# Patient Record
Sex: MALE | Race: WHITE | Employment: UNEMPLOYED | URBAN - METROPOLITAN AREA
[De-identification: names, ages, dates, MRNs, and addresses within clinical notes are randomized per-mention and may not be internally consistent; named-entity substitution may affect disease eponyms.]

---

## 2020-01-15 ENCOUNTER — HOSPITAL ENCOUNTER (EMERGENCY)
Age: 80
Discharge: HOME | End: 2020-01-16
Attending: EMERGENCY MEDICINE
Payer: MEDICARE

## 2020-01-15 PROCEDURE — 99284 EMERGENCY DEPT VISIT MOD MDM: CPT

## 2020-01-16 VITALS
HEIGHT: 72 IN | RESPIRATION RATE: 16 BRPM | WEIGHT: 235 LBS | HEART RATE: 68 BPM | TEMPERATURE: 97 F | SYSTOLIC BLOOD PRESSURE: 114 MMHG | DIASTOLIC BLOOD PRESSURE: 56 MMHG | OXYGEN SATURATION: 95 % | BODY MASS INDEX: 31.83 KG/M2

## 2020-01-16 LAB
ALBUMIN SERPL-MCNC: 3.7 G/DL (ref 3.5–4.6)
ALP BLD-CCNC: 107 U/L (ref 35–104)
ALT SERPL-CCNC: 7 U/L (ref 0–41)
ANION GAP SERPL CALCULATED.3IONS-SCNC: 13 MEQ/L (ref 9–15)
AST SERPL-CCNC: 15 U/L (ref 0–40)
BACTERIA: ABNORMAL /HPF
BASOPHILS ABSOLUTE: 0 K/UL (ref 0–0.2)
BASOPHILS RELATIVE PERCENT: 0.3 %
BILIRUB SERPL-MCNC: 0.6 MG/DL (ref 0.2–0.7)
BILIRUBIN URINE: ABNORMAL
BLOOD, URINE: ABNORMAL
BUN BLDV-MCNC: 17 MG/DL (ref 8–23)
CALCIUM SERPL-MCNC: 9.1 MG/DL (ref 8.5–9.9)
CHLORIDE BLD-SCNC: 93 MEQ/L (ref 95–107)
CLARITY: ABNORMAL
CO2: 22 MEQ/L (ref 20–31)
COLOR: ABNORMAL
CREAT SERPL-MCNC: 0.81 MG/DL (ref 0.7–1.2)
EOSINOPHILS ABSOLUTE: 0.1 K/UL (ref 0–0.7)
EOSINOPHILS RELATIVE PERCENT: 1.6 %
EPITHELIAL CELLS, UA: ABNORMAL /HPF
GFR AFRICAN AMERICAN: >60
GFR NON-AFRICAN AMERICAN: >60
GLOBULIN: 2.7 G/DL (ref 2.3–3.5)
GLUCOSE BLD-MCNC: 117 MG/DL (ref 70–99)
GLUCOSE URINE: NEGATIVE MG/DL
HCT VFR BLD CALC: 39.4 % (ref 42–52)
HEMOGLOBIN: 13.2 G/DL (ref 14–18)
INR BLD: 1.9
KETONES, URINE: NEGATIVE MG/DL
LEUKOCYTE ESTERASE, URINE: NEGATIVE
LYMPHOCYTES ABSOLUTE: 0.7 K/UL (ref 1–4.8)
LYMPHOCYTES RELATIVE PERCENT: 14.1 %
MCH RBC QN AUTO: 29.6 PG (ref 27–31.3)
MCHC RBC AUTO-ENTMCNC: 33.4 % (ref 33–37)
MCV RBC AUTO: 88.5 FL (ref 80–100)
MONOCYTES ABSOLUTE: 0.3 K/UL (ref 0.2–0.8)
MONOCYTES RELATIVE PERCENT: 5.2 %
NEUTROPHILS ABSOLUTE: 3.9 K/UL (ref 1.4–6.5)
NEUTROPHILS RELATIVE PERCENT: 78.8 %
NITRITE, URINE: NEGATIVE
PDW BLD-RTO: 13.7 % (ref 11.5–14.5)
PH UA: 6.5 (ref 5–9)
PLATELET # BLD: 227 K/UL (ref 130–400)
POTASSIUM SERPL-SCNC: 3.5 MEQ/L (ref 3.4–4.9)
PROSTATE SPECIFIC ANTIGEN: 0.8 NG/ML (ref 0–6.22)
PROTEIN UA: >=300 MG/DL
PROTHROMBIN TIME: 22.1 SEC (ref 12.3–14.9)
RBC # BLD: 4.46 M/UL (ref 4.7–6.1)
RBC UA: >100 /HPF (ref 0–2)
SODIUM BLD-SCNC: 128 MEQ/L (ref 135–144)
SPECIFIC GRAVITY UA: 1.01 (ref 1–1.03)
TOTAL PROTEIN: 6.4 G/DL (ref 6.3–8)
URINE REFLEX TO CULTURE: YES
UROBILINOGEN, URINE: 1 E.U./DL
WBC # BLD: 5 K/UL (ref 4.8–10.8)
WBC UA: ABNORMAL /HPF (ref 0–5)

## 2020-01-16 PROCEDURE — 80053 COMPREHEN METABOLIC PANEL: CPT

## 2020-01-16 PROCEDURE — 87086 URINE CULTURE/COLONY COUNT: CPT

## 2020-01-16 PROCEDURE — 6370000000 HC RX 637 (ALT 250 FOR IP): Performed by: EMERGENCY MEDICINE

## 2020-01-16 PROCEDURE — 85610 PROTHROMBIN TIME: CPT

## 2020-01-16 PROCEDURE — 81001 URINALYSIS AUTO W/SCOPE: CPT

## 2020-01-16 PROCEDURE — 85025 COMPLETE CBC W/AUTO DIFF WBC: CPT

## 2020-01-16 PROCEDURE — 84153 ASSAY OF PSA TOTAL: CPT

## 2020-01-16 PROCEDURE — 2580000003 HC RX 258: Performed by: EMERGENCY MEDICINE

## 2020-01-16 RX ORDER — 0.9 % SODIUM CHLORIDE 0.9 %
1000 INTRAVENOUS SOLUTION INTRAVENOUS ONCE
Status: COMPLETED | OUTPATIENT
Start: 2020-01-16 | End: 2020-01-16

## 2020-01-16 RX ORDER — CIPROFLOXACIN 500 MG/1
500 TABLET, FILM COATED ORAL 2 TIMES DAILY
Qty: 20 TABLET | Refills: 0 | Status: SHIPPED | OUTPATIENT
Start: 2020-01-16 | End: 2020-01-26

## 2020-01-16 RX ORDER — SODIUM CHLORIDE 0.9 % (FLUSH) 0.9 %
3 SYRINGE (ML) INJECTION EVERY 8 HOURS
Status: DISCONTINUED | OUTPATIENT
Start: 2020-01-16 | End: 2020-01-16 | Stop reason: HOSPADM

## 2020-01-16 RX ORDER — CIPROFLOXACIN 500 MG/1
500 TABLET, FILM COATED ORAL ONCE
Status: COMPLETED | OUTPATIENT
Start: 2020-01-16 | End: 2020-01-16

## 2020-01-16 RX ADMIN — SODIUM CHLORIDE 1000 ML: 9 INJECTION, SOLUTION INTRAVENOUS at 00:43

## 2020-01-16 RX ADMIN — Medication 3 ML: at 00:43

## 2020-01-16 RX ADMIN — CIPROFLOXACIN 500 MG: 500 TABLET, FILM COATED ORAL at 01:06

## 2020-01-16 ASSESSMENT — ENCOUNTER SYMPTOMS
VOMITING: 0
BACK PAIN: 0
EYE PAIN: 0
SHORTNESS OF BREATH: 0
BLOOD IN STOOL: 0
SINUS PRESSURE: 0
EYE DISCHARGE: 0
CHEST TIGHTNESS: 0
CONSTIPATION: 0
TROUBLE SWALLOWING: 0
SORE THROAT: 0
EYE REDNESS: 0
DIARRHEA: 0
FACIAL SWELLING: 0
WHEEZING: 0
CHOKING: 0
COUGH: 0
VOICE CHANGE: 0
STRIDOR: 0
ABDOMINAL PAIN: 0

## 2020-01-16 NOTE — ED PROVIDER NOTES
[facility] ED  eMERGENCY dEPARTMENT eNCOUnter      Pt Name: Priscila Wang  MRN: [id number]  [place] 1940  Date of evaluation: 1/15/2020  Provider: Preston Driver MD    [address] [place] [address]       Chief Complaint   Patient presents with    Abdominal Pain    Groin Pain         HISTORY OF PRESENT ILLNESS   (Location/Symptom, Timing/Onset,Context/Setting, Quality, Duration, Modifying Factors, Severity)  Note limiting factors. Priscila Wang is a [de-identified] y.o. male who presents to the emergency department patient with lower abdominal pelvic pain unable to urinate empty the bladder was seen at [facility] yesterday no nausea no vomiting no fever no chills status post cholecystectomy ex-smoker status post colonoscopy history of anxiety depression and status post CVA    HPI    NursingNotes were reviewed. REVIEW OF SYSTEMS    (2-9 systems for level 4, 10 or more for level 5)     Review of Systems   Constitutional: Negative. Negative for activity change and fever. HENT: Negative for congestion, drooling, facial swelling, mouth sores, nosebleeds, sinus pressure, sore throat, trouble swallowing and voice change. Eyes: Negative for pain, discharge, redness and visual disturbance. Respiratory: Negative for cough, choking, chest tightness, shortness of breath, wheezing and stridor. Cardiovascular: Negative for chest pain, palpitations and leg swelling. Gastrointestinal: Negative for abdominal pain, blood in stool, constipation, diarrhea and vomiting. Endocrine: Negative for cold intolerance, polyphagia and polyuria. Genitourinary: Positive for difficulty urinating, hematuria and urgency. Negative for dysuria, flank pain, frequency and genital sores. Musculoskeletal: Negative for back pain, joint swelling, neck pain and neck stiffness. Skin: Negative for pallor and rash. Neurological: Negative for tremors, seizures, syncope, weakness, numbness and headaches. Hematological: Negative for adenopathy. Never Used   Substance and Sexual Activity    Alcohol use: Yes     Comment: Rare beer    Drug use: Never    Sexual activity: None   Lifestyle    Physical activity:     Days per week: None     Minutes per session: None    Stress: None   Relationships    Social connections:     Talks on phone: None     Gets together: None     Attends Mosque service: None     Active member of club or organization: None     Attends meetings of clubs or organizations: None     Relationship status: None    Intimate partner violence:     Fear of current or ex partner: None     Emotionally abused: None     Physically abused: None     Forced sexual activity: None   Other Topics Concern    None   Social History Narrative    None       SCREENINGS      @FLOW([id number])@      PHYSICAL EXAM    (up to 7 for level 4, 8 or more for level 5)     ED Triage Vitals [01/15/20 2355]   BP Temp Temp Source Pulse Resp SpO2 Height Weight   (!) 158/72 97 °F (36.1 °C) Temporal 72 17 99 % 6' (1.829 m) 235 lb (106.6 kg)       Physical Exam  Nursing note reviewed. Exam conducted with a chaperone present. Constitutional:       Appearance: He is well-developed and normal weight. Comments: Alert and slightly uncomfortable because of the urinary retention   HENT:      Head: Normocephalic and atraumatic. Right Ear: Tympanic membrane and ear canal normal.      Left Ear: Tympanic membrane normal.      Mouth/Throat:      Mouth: Mucous membranes are moist.   Eyes:      Pupils: Pupils are equal, round, and reactive to light. Neck:      Musculoskeletal: Normal range of motion and neck supple. No neck rigidity. Cardiovascular:      Rate and Rhythm: Normal rate and regular rhythm. Heart sounds: Normal heart sounds. No murmur. No gallop. Pulmonary:      Effort: No respiratory distress. Breath sounds: Normal breath sounds. No wheezing. Abdominal:      General: Bowel sounds are normal.      Palpations: Abdomen is soft. There is no mass. Tenderness: There is no rebound. Comments:  bladder seem to fall minimal discomfort to the lower abdomen no rebound tenderness no masses felt   Musculoskeletal: Normal range of motion. General: No tenderness. Lymphadenopathy:      Cervical: No cervical adenopathy. Skin:     General: Skin is warm. Capillary Refill: Capillary refill takes less than 2 seconds. Findings: No erythema or rash. Neurological:      Mental Status: He is alert and oriented to person, place, and time. Cranial Nerves: No cranial nerve deficit. Motor: No abnormal muscle tone. Psychiatric:         Behavior: Behavior normal.         Thought Content:  Thought content normal.         DIAGNOSTIC RESULTS     EKG: All EKG's are interpreted by the Emergency Department Physician who either signs or Co-signsthis chart in the absence of a cardiologist.        RADIOLOGY:   [place] such as CT, Ultrasound and MRI are read by the radiologist. Plain radiographic images are visualized and preliminarily interpreted by the emergency physician with the below findings:      Interpretation per the Radiologist below, if available at the time ofthis note:    No orders to display         ED BEDSIDE ULTRASOUND:   Performed by ED Physician - none    LABS:  Labs Reviewed   CBC WITH AUTO DIFFERENTIAL - Abnormal; Notable for the following components:       Result Value    RBC 4.46 (*)     Hemoglobin 13.2 (*)     Hematocrit 39.4 (*)     Lymphocytes Absolute 0.7 (*)     All other components within normal limits   URINE RT REFLEX TO CULTURE - Abnormal; Notable for the following components:    Protein, UA >=300 (*)     All other components within normal limits   MICROSCOPIC URINALYSIS - Abnormal; Notable for the following components:    RBC, UA >100 (*)     All other components within normal limits   URINE CULTURE   COMPREHENSIVE METABOLIC PANEL   PROTIME-INR   PSA SCREENING       All other labs were within normal range or not returned as of this dictation. EMERGENCY DEPARTMENT COURSE and DIFFERENTIAL DIAGNOSIS/MDM:   Vitals:    Vitals:    01/15/20 2355   BP: (!) 158/72   Pulse: 72   Resp: 17   Temp: 97 °F (36.1 °C)   TempSrc: Temporal   SpO2: 99%   Weight: 235 lb (106.6 kg)   Height: 6' (1.829 m)           MDM  Number of Diagnoses or Management Options  Gross hematuria: established and improving  Retention of urine: established and improving  Diagnosis management comments: Patient seen at [facility] for the same thing but they did not pass any catheter as per family and patient patient uncomfortable bladder seem to be distended a catheter passed and drained 1 L of urine with some hematuria hematuria seems to be subsiding after 1 L of urine output patient is most comfortable at this time poor historian unable to stated whether this is a BPH versus prostate cancer patient was told that it may be of cancer at [facility] as per family members PSA  is pending at this time, patient will be referred to urology but patient seems like follows [facility] patient to go home with a leg bag and [place] catheter will cover him with Cipro at this time patient understood very well       Amount and/or Complexity of Data Reviewed  Clinical lab tests: ordered and reviewed        CRITICAL CARE TIME   Total Critical Care time was  minutes, excluding separately reportableprocedures. There was a high probability of clinicallysignificant/life threatening deterioration in the patient's condition which required my urgent intervention. CONSULTS:  None    PROCEDURES:  Unless otherwise noted below, none     Procedures    FINAL IMPRESSION      1. Retention of urine    2. Gross hematuria          DISPOSITION/PLAN   DISPOSITION        PATIENT REFERRED TO:  No follow-up provider specified.     DISCHARGE MEDICATIONS:  New Prescriptions    No medications on file          (Please note that portions of this note were completed with a voice recognition

## 2020-01-16 NOTE — ED TRIAGE NOTES
Abdominal, groin, penile pain x2 days. Seen yesterday at [address] [place] [address] ER and discharged. Reports blood in urine with clots. Takes coumadin daily. 100 mcg fentanyl, 4 mg zofran given en route by EMS. A/0 x3, ambulatory, resps even and unlabored on room air. Spouse at bedside.

## 2020-01-18 LAB — URINE CULTURE, ROUTINE: NORMAL

## 2022-05-17 LAB
FOLATE: 3.6 NG/ML
TSH SERPL DL<=0.05 MIU/L-ACNC: 0.58 MIU/L (ref 0.44–3.9)
VITAMIN B-12: 357 PG/ML (ref 211–911)
VITAMIN D 25-HYDROXY: 56 NG/ML

## 2022-09-20 ENCOUNTER — HOSPITAL ENCOUNTER (OUTPATIENT)
Age: 82
Setting detail: SPECIMEN
Discharge: HOME | End: 2022-09-20
Payer: COMMERCIAL

## 2022-09-20 LAB — AMMONIA: 30 UMOL/L (ref 16–60)

## 2022-09-20 PROCEDURE — 82140 ASSAY OF AMMONIA: CPT

## 2023-05-11 ENCOUNTER — HOSPITAL ENCOUNTER (OUTPATIENT)
Age: 83
Setting detail: SPECIMEN
Discharge: HOME | End: 2023-05-11

## 2023-05-11 LAB
REASON FOR REJECTION: NORMAL
REJECTED TEST: NORMAL

## 2023-05-12 ENCOUNTER — HOSPITAL ENCOUNTER (OUTPATIENT)
Age: 83
Setting detail: SPECIMEN
Discharge: HOME | End: 2023-05-12
Payer: COMMERCIAL

## 2023-05-12 LAB — AMMONIA PLAS-SCNC: 32 UMOL/L (ref 16–60)

## 2023-05-12 PROCEDURE — 82140 ASSAY OF AMMONIA: CPT

## 2024-05-28 ENCOUNTER — HOSPITAL ENCOUNTER (OUTPATIENT)
Dept: RADIOLOGY | Facility: CLINIC | Age: 84
Discharge: HOME | End: 2024-05-28
Payer: MEDICAID

## 2024-05-28 ENCOUNTER — OFFICE VISIT (OUTPATIENT)
Dept: ORTHOPEDIC SURGERY | Facility: CLINIC | Age: 84
End: 2024-05-28
Payer: MEDICAID

## 2024-05-28 DIAGNOSIS — M25.512 ACUTE PAIN OF LEFT SHOULDER: ICD-10-CM

## 2024-05-28 DIAGNOSIS — S42.032A TRAUMATIC CLOSED FRACTURE OF DISTAL CLAVICLE WITH MINIMAL DISPLACEMENT, LEFT, INITIAL ENCOUNTER: ICD-10-CM

## 2024-05-28 DIAGNOSIS — M89.8X1 PAIN OF LEFT CLAVICLE: ICD-10-CM

## 2024-05-28 PROCEDURE — 1159F MED LIST DOCD IN RCRD: CPT | Performed by: FAMILY MEDICINE

## 2024-05-28 PROCEDURE — 73000 X-RAY EXAM OF COLLAR BONE: CPT | Mod: LEFT SIDE | Performed by: FAMILY MEDICINE

## 2024-05-28 PROCEDURE — 73000 X-RAY EXAM OF COLLAR BONE: CPT | Mod: LT

## 2024-05-28 PROCEDURE — 99203 OFFICE O/P NEW LOW 30 MIN: CPT | Performed by: FAMILY MEDICINE

## 2024-05-28 PROCEDURE — 1157F ADVNC CARE PLAN IN RCRD: CPT | Performed by: FAMILY MEDICINE

## 2024-05-28 PROCEDURE — 99213 OFFICE O/P EST LOW 20 MIN: CPT | Performed by: FAMILY MEDICINE

## 2024-05-28 PROCEDURE — 73030 X-RAY EXAM OF SHOULDER: CPT | Mod: LEFT SIDE | Performed by: FAMILY MEDICINE

## 2024-05-28 PROCEDURE — 73030 X-RAY EXAM OF SHOULDER: CPT | Mod: LT

## 2024-05-28 PROCEDURE — 1160F RVW MEDS BY RX/DR IN RCRD: CPT | Performed by: FAMILY MEDICINE

## 2024-05-28 NOTE — PROGRESS NOTES
Acute Injury New Patient Visit    CC:   Chief Complaint   Patient presents with    Left Clavicle - Pain, New Patient Visit     Pain s/p Fall 6-8 wks ago per Pt - Left Clavicle H/O Fx       HPI: Willian is a 83 y.o.male who presents today with new complaints of pain discomfort to the left shoulder.  He reportedly had a fall about 6 to 8 weeks ago he had a distal clavicle fracture [facility]  He presents here today for follow-up and further evaluation.  He is currently residing in a skilled nursing facility in [facility].  He denies any numbness tingling or burning.  Is hopeful to be no longer requiring the use of the sling.  He states he has good range of motion and no pain.        Review of Systems   GENERAL: Negative for malaise, significant weight loss, fever  MUSCULOSKELETAL: See HPI  NEURO: Negative for numbness / tingling     Past Medical History  History reviewed. No pertinent past medical history.    Medication review  Medication Documentation Review Audit       Reviewed by Cole C Budinsky, MD (Physician) on 05/28/24 at 1610      Medication Order Taking? Sig Documenting Provider Last Dose Status            No Medications to Display                                   Allergies  Not on File    Social History  Social History     Socioeconomic History    Marital status:      Spouse name: Not on file    Number of children: Not on file    Years of education: Not on file    Highest education level: Not on file   Occupational History    Not on file   Tobacco Use    Smoking status: Not on file    Smokeless tobacco: Not on file   Substance and Sexual Activity    Alcohol use: Not on file    Drug use: Not on file    Sexual activity: Not on file   Other Topics Concern    Not on file   Social History Narrative    Not on file     Social Determinants of Health     Financial Resource Strain: Not on file   Food Insecurity: Not on file   Transportation Needs: Not on file    Physical Activity: Not on file   Stress: Not on file   Social Connections: Not on file   Intimate Partner Violence: Not on file   Housing Stability: Not on file       Surgical History  History reviewed. No pertinent surgical history.    Physical Exam:  GENERAL:  Patient is awake, alert, and oriented to person place and time.  Patient appears well nourished and well kept.  Affect Calm, Not Acutely Distressed.  HEENT:  Normocephalic, Atraumatic, EOMI  CARDIOVASCULAR:  Hemodynamically stable.  RESPIRATORY:  Normal respirations with unlabored breathing.  NEURO: Gross sensation intact to the upper extremities bilaterally.  Extremity: Left shoulder demonstrates very minimal tenderness palpation directly over the distal clavicle.  Patient has excellent full forward flexion to 145 degrees lateral abduction to 90 good internal and external rotation  strength equal symmetric and intact.  Biceps and tricep strength equal symmetric 5 out of 5.      Diagnostics: X-rays today demonstrate mild osteoarthritic changes stable appearing left distal clavicle fracture        Procedure: None  Procedures    Assessment:   Problem List Items Addressed This Visit    None  Visit Diagnoses       Pain of left clavicle        Relevant Orders    XR clavicle left    Acute pain of left shoulder        Relevant Orders    XR shoulder left 2+ views    Referral to Physical Therapy    Traumatic closed fracture of distal clavicle with minimal displacement, left, initial encounter        Relevant Orders    Referral to Physical Therapy             Plan: At this time we will offer the patient referral to physical therapy at his facility.  Recommended alternating Tylenol or ibuprofen if able.  No need for scheduled narcotic or pain medication.  Patient is almost 2 months out, at this time we will increase all weightbearing activities as tolerated.  He will no longer require the use of his sling.  Will see him back in 6 to 8 weeks in the office, we will  repeat x-rays of the clavicle 2 views of the left clavicle at that time.  If the patient still has persistent discomfort or pain we can consider shoulder injection if necessary however would like to hold off on that here today as I would like more time to allow for healing.  He will continue to utilize the vitamin D that I see is on his MAR for total of 4000 units/day.  They should call or return sooner with any issues.  He may again increase weightbearing activity as tolerated incorporate physical therapy for range of motion and strength recovery.  Plan to follow-up in 6 to 8 weeks.        Addendum: I was brought to my attention after the facility called to discuss the patient's care.  Reportedly the patient had fallen just the other day on the 24th.  Patient is known to be demented in their care unit.  Nursing staff informed us of the updates.  With his new information, we will recommend the patient maintain nonweightbearing to the left upper extremity to the best of his ability and to utilize the sling for comfort and support while awake and active.  Would hold off on formal physical therapy for now.  Patient may utilize topical ice 10 to 15 minutes at a time several times per day if necessary to assist with pain control.  Additional help with topical muscle rubs or creams or Tylenol or other pain medications as per his primary care team at the skilled nursing facility would be adequate.  We will still plan to see the patient back in 6 to 8 weeks, we will repeat x-rays 2 views of the left clavicle at that time.  Will likely incorporate physical therapy at that point going forward.  Patient will continue with his vitamin D supplementation.  This message was also forwarded along to the staff to update the care.  Orders Placed This Encounter    XR clavicle left    XR shoulder left 2+ views    Referral to Physical Therapy      At the conclusion of the visit there were no further questions by the patient/family  regarding their plan of care.  Patient was instructed to call or return with any issues, questions, or concerns regarding their injury and/or treatment plan described above.     05/29/24 at 1:35 PM - Cole C Budinsky, MD    Office: [phone]    This note was prepared using voice recognition software.  The details of this note are correct and have been reviewed, and corrected to the best of my ability.  Some grammatical errors may persist related to the Dragon software.

## 2024-07-01 ENCOUNTER — APPOINTMENT (OUTPATIENT)
Dept: RADIOLOGY | Facility: HOSPITAL | Age: 84
End: 2024-07-01
Payer: COMMERCIAL

## 2024-07-01 ENCOUNTER — HOSPITAL ENCOUNTER (INPATIENT)
Facility: HOSPITAL | Age: 84
LOS: 3 days | Discharge: NURSING, REHAB OR LONG-TERM CARE | End: 2024-07-04
Attending: STUDENT IN AN ORGANIZED HEALTH CARE EDUCATION/TRAINING PROGRAM | Admitting: STUDENT IN AN ORGANIZED HEALTH CARE EDUCATION/TRAINING PROGRAM
Payer: COMMERCIAL

## 2024-07-01 DIAGNOSIS — N39.0 URINARY TRACT INFECTION WITHOUT HEMATURIA, SITE UNSPECIFIED: ICD-10-CM

## 2024-07-01 DIAGNOSIS — A41.9 SEPSIS (MULTI): Primary | ICD-10-CM

## 2024-07-01 PROBLEM — F01.518 VASCULAR DEMENTIA WITH BEHAVIOR DISTURBANCE (MULTI): Status: ACTIVE | Noted: 2023-07-28

## 2024-07-01 PROBLEM — S42.009A FRACTURE OF CLAVICLE: Status: ACTIVE | Noted: 2024-07-01

## 2024-07-01 PROBLEM — G92.8 TOXIC METABOLIC ENCEPHALOPATHY: Status: ACTIVE | Noted: 2024-07-01

## 2024-07-01 PROBLEM — N40.0 BENIGN PROSTATIC HYPERPLASIA WITHOUT LOWER URINARY TRACT SYMPTOMS: Status: ACTIVE | Noted: 2024-07-01

## 2024-07-01 PROBLEM — R79.89 ELEVATED TROPONIN: Status: ACTIVE | Noted: 2024-07-01

## 2024-07-01 PROBLEM — N40.0 BENIGN PROSTATIC HYPERPLASIA WITHOUT LOWER URINARY TRACT SYMPTOMS: Status: RESOLVED | Noted: 2024-07-01 | Resolved: 2024-07-01

## 2024-07-01 PROBLEM — I95.1 ORTHOSTATIC HYPOTENSION: Status: ACTIVE | Noted: 2023-07-28

## 2024-07-01 LAB
ALBUMIN SERPL BCP-MCNC: 3.8 G/DL (ref 3.4–5)
ALP SERPL-CCNC: 101 U/L (ref 33–136)
ALT SERPL W P-5'-P-CCNC: 31 U/L (ref 10–52)
ANION GAP BLDV CALCULATED.4IONS-SCNC: 12 MMOL/L (ref 10–25)
ANION GAP SERPL CALC-SCNC: 11 MMOL/L (ref 10–20)
APPEARANCE UR: ABNORMAL
APTT PPP: 29 SECONDS (ref 27–38)
AST SERPL W P-5'-P-CCNC: 142 U/L (ref 9–39)
BACTERIA #/AREA URNS AUTO: ABNORMAL /HPF
BASE EXCESS BLDV CALC-SCNC: 1.7 MMOL/L (ref -2–3)
BASOPHILS # BLD AUTO: 0.01 X10*3/UL (ref 0–0.1)
BASOPHILS NFR BLD AUTO: 0.1 %
BILIRUB SERPL-MCNC: 0.6 MG/DL (ref 0–1.2)
BILIRUB UR STRIP.AUTO-MCNC: NEGATIVE MG/DL
BODY TEMPERATURE: ABNORMAL
BUN SERPL-MCNC: 28 MG/DL (ref 6–23)
CA-I BLDV-SCNC: 1.19 MMOL/L (ref 1.1–1.33)
CALCIUM SERPL-MCNC: 9.1 MG/DL (ref 8.6–10.3)
CARDIAC TROPONIN I PNL SERPL HS: 394 NG/L (ref 0–20)
CARDIAC TROPONIN I PNL SERPL HS: 419 NG/L (ref 0–20)
CHLORIDE BLDV-SCNC: 103 MMOL/L (ref 98–107)
CHLORIDE SERPL-SCNC: 105 MMOL/L (ref 98–107)
CO2 SERPL-SCNC: 27 MMOL/L (ref 21–32)
COLOR UR: ABNORMAL
CREAT SERPL-MCNC: 1.21 MG/DL (ref 0.5–1.3)
EGFRCR SERPLBLD CKD-EPI 2021: 59 ML/MIN/1.73M*2
EOSINOPHIL # BLD AUTO: 0 X10*3/UL (ref 0–0.4)
EOSINOPHIL NFR BLD AUTO: 0 %
ERYTHROCYTE [DISTWIDTH] IN BLOOD BY AUTOMATED COUNT: 13.4 % (ref 11.5–14.5)
FLUAV RNA RESP QL NAA+PROBE: NOT DETECTED
FLUBV RNA RESP QL NAA+PROBE: NOT DETECTED
GLUCOSE BLDV-MCNC: 129 MG/DL (ref 74–99)
GLUCOSE SERPL-MCNC: 128 MG/DL (ref 74–99)
GLUCOSE UR STRIP.AUTO-MCNC: NORMAL MG/DL
HCO3 BLDV-SCNC: 27.7 MMOL/L (ref 22–26)
HCT VFR BLD AUTO: 40 % (ref 41–52)
HCT VFR BLD EST: 39 % (ref 41–52)
HGB BLD-MCNC: 12.9 G/DL (ref 13.5–17.5)
HGB BLDV-MCNC: 13 G/DL (ref 13.5–17.5)
HOLD SPECIMEN: NORMAL
HOLD SPECIMEN: NORMAL
IMM GRANULOCYTES # BLD AUTO: 0.03 X10*3/UL (ref 0–0.5)
IMM GRANULOCYTES NFR BLD AUTO: 0.4 % (ref 0–0.9)
INHALED O2 CONCENTRATION: 0 %
INR PPP: 1.2 (ref 0.9–1.1)
KETONES UR STRIP.AUTO-MCNC: NEGATIVE MG/DL
LACTATE BLDV-SCNC: 1.3 MMOL/L (ref 0.4–2)
LACTATE SERPL-SCNC: 1 MMOL/L (ref 0.4–2)
LEUKOCYTE ESTERASE UR QL STRIP.AUTO: ABNORMAL
LYMPHOCYTES # BLD AUTO: 0.29 X10*3/UL (ref 0.8–3)
LYMPHOCYTES NFR BLD AUTO: 3.5 %
MCH RBC QN AUTO: 31.4 PG (ref 26–34)
MCHC RBC AUTO-ENTMCNC: 32.3 G/DL (ref 32–36)
MCV RBC AUTO: 97 FL (ref 80–100)
MONOCYTES # BLD AUTO: 0.23 X10*3/UL (ref 0.05–0.8)
MONOCYTES NFR BLD AUTO: 2.8 %
MRSA DNA SPEC QL NAA+PROBE: NOT DETECTED
NEUTROPHILS # BLD AUTO: 7.68 X10*3/UL (ref 1.6–5.5)
NEUTROPHILS NFR BLD AUTO: 93.2 %
NITRITE UR QL STRIP.AUTO: NEGATIVE
NRBC BLD-RTO: 0 /100 WBCS (ref 0–0)
OXYHGB MFR BLDV: 28.6 % (ref 45–75)
PCO2 BLDV: 48 MM HG (ref 41–51)
PH BLDV: 7.37 PH (ref 7.33–7.43)
PH UR STRIP.AUTO: 7.5 [PH]
PLATELET # BLD AUTO: 159 X10*3/UL (ref 150–450)
PO2 BLDV: 22 MM HG (ref 35–45)
POTASSIUM BLDV-SCNC: 3.9 MMOL/L (ref 3.5–5.3)
POTASSIUM SERPL-SCNC: 3.9 MMOL/L (ref 3.5–5.3)
PROT SERPL-MCNC: 6.9 G/DL (ref 6.4–8.2)
PROT UR STRIP.AUTO-MCNC: ABNORMAL MG/DL
PROTHROMBIN TIME: 13.5 SECONDS (ref 9.8–12.8)
RBC # BLD AUTO: 4.11 X10*6/UL (ref 4.5–5.9)
RBC # UR STRIP.AUTO: ABNORMAL /UL
RBC #/AREA URNS AUTO: >20 /HPF
SAO2 % BLDV: 29 % (ref 45–75)
SARS-COV-2 RNA RESP QL NAA+PROBE: NOT DETECTED
SODIUM BLDV-SCNC: 139 MMOL/L (ref 136–145)
SODIUM SERPL-SCNC: 139 MMOL/L (ref 136–145)
SP GR UR STRIP.AUTO: 1.02
TRANS CELLS #/AREA UR COMP ASSIST: ABNORMAL /HPF
UROBILINOGEN UR STRIP.AUTO-MCNC: ABNORMAL MG/DL
WBC # BLD AUTO: 8.2 X10*3/UL (ref 4.4–11.3)
WBC #/AREA URNS AUTO: >50 /HPF
WBC CLUMPS #/AREA URNS AUTO: ABNORMAL /HPF

## 2024-07-01 PROCEDURE — 87641 MR-STAPH DNA AMP PROBE: CPT | Performed by: STUDENT IN AN ORGANIZED HEALTH CARE EDUCATION/TRAINING PROGRAM

## 2024-07-01 PROCEDURE — 84132 ASSAY OF SERUM POTASSIUM: CPT | Performed by: STUDENT IN AN ORGANIZED HEALTH CARE EDUCATION/TRAINING PROGRAM

## 2024-07-01 PROCEDURE — 85025 COMPLETE CBC W/AUTO DIFF WBC: CPT | Performed by: STUDENT IN AN ORGANIZED HEALTH CARE EDUCATION/TRAINING PROGRAM

## 2024-07-01 PROCEDURE — 99285 EMERGENCY DEPT VISIT HI MDM: CPT | Mod: 25

## 2024-07-01 PROCEDURE — 87040 BLOOD CULTURE FOR BACTERIA: CPT | Mod: ELYLAB | Performed by: STUDENT IN AN ORGANIZED HEALTH CARE EDUCATION/TRAINING PROGRAM

## 2024-07-01 PROCEDURE — 84484 ASSAY OF TROPONIN QUANT: CPT | Performed by: STUDENT IN AN ORGANIZED HEALTH CARE EDUCATION/TRAINING PROGRAM

## 2024-07-01 PROCEDURE — 2500000001 HC RX 250 WO HCPCS SELF ADMINISTERED DRUGS (ALT 637 FOR MEDICARE OP): Performed by: NURSE PRACTITIONER

## 2024-07-01 PROCEDURE — 71045 X-RAY EXAM CHEST 1 VIEW: CPT | Mod: FOREIGN READ | Performed by: RADIOLOGY

## 2024-07-01 PROCEDURE — 99223 1ST HOSP IP/OBS HIGH 75: CPT | Performed by: NURSE PRACTITIONER

## 2024-07-01 PROCEDURE — 81001 URINALYSIS AUTO W/SCOPE: CPT | Performed by: STUDENT IN AN ORGANIZED HEALTH CARE EDUCATION/TRAINING PROGRAM

## 2024-07-01 PROCEDURE — 87636 SARSCOV2 & INF A&B AMP PRB: CPT | Performed by: STUDENT IN AN ORGANIZED HEALTH CARE EDUCATION/TRAINING PROGRAM

## 2024-07-01 PROCEDURE — 85610 PROTHROMBIN TIME: CPT | Performed by: STUDENT IN AN ORGANIZED HEALTH CARE EDUCATION/TRAINING PROGRAM

## 2024-07-01 PROCEDURE — 83605 ASSAY OF LACTIC ACID: CPT | Performed by: STUDENT IN AN ORGANIZED HEALTH CARE EDUCATION/TRAINING PROGRAM

## 2024-07-01 PROCEDURE — 71045 X-RAY EXAM CHEST 1 VIEW: CPT

## 2024-07-01 PROCEDURE — 96375 TX/PRO/DX INJ NEW DRUG ADDON: CPT

## 2024-07-01 PROCEDURE — 1200000002 HC GENERAL ROOM WITH TELEMETRY DAILY

## 2024-07-01 PROCEDURE — 96367 TX/PROPH/DG ADDL SEQ IV INF: CPT

## 2024-07-01 PROCEDURE — 36415 COLL VENOUS BLD VENIPUNCTURE: CPT | Performed by: STUDENT IN AN ORGANIZED HEALTH CARE EDUCATION/TRAINING PROGRAM

## 2024-07-01 PROCEDURE — P9612 CATHETERIZE FOR URINE SPEC: HCPCS

## 2024-07-01 PROCEDURE — 87086 URINE CULTURE/COLONY COUNT: CPT | Mod: ELYLAB | Performed by: STUDENT IN AN ORGANIZED HEALTH CARE EDUCATION/TRAINING PROGRAM

## 2024-07-01 PROCEDURE — 2500000004 HC RX 250 GENERAL PHARMACY W/ HCPCS (ALT 636 FOR OP/ED): Performed by: STUDENT IN AN ORGANIZED HEALTH CARE EDUCATION/TRAINING PROGRAM

## 2024-07-01 PROCEDURE — 96365 THER/PROPH/DIAG IV INF INIT: CPT

## 2024-07-01 PROCEDURE — 93010 ELECTROCARDIOGRAM REPORT: CPT | Performed by: INTERNAL MEDICINE

## 2024-07-01 RX ORDER — ACETAMINOPHEN 160 MG/5ML
650 SOLUTION ORAL EVERY 4 HOURS PRN
Status: DISCONTINUED | OUTPATIENT
Start: 2024-07-01 | End: 2024-07-02

## 2024-07-01 RX ORDER — PANTOPRAZOLE SODIUM 40 MG/1
40 TABLET, DELAYED RELEASE ORAL DAILY
Status: DISCONTINUED | OUTPATIENT
Start: 2024-07-02 | End: 2024-07-04 | Stop reason: HOSPADM

## 2024-07-01 RX ORDER — ONDANSETRON HYDROCHLORIDE 2 MG/ML
4 INJECTION, SOLUTION INTRAVENOUS EVERY 8 HOURS PRN
Status: DISCONTINUED | OUTPATIENT
Start: 2024-07-01 | End: 2024-07-02

## 2024-07-01 RX ORDER — ACETAMINOPHEN 325 MG/1
650 TABLET ORAL EVERY 4 HOURS PRN
Status: DISCONTINUED | OUTPATIENT
Start: 2024-07-01 | End: 2024-07-02

## 2024-07-01 RX ORDER — ONDANSETRON 4 MG/1
4 TABLET, FILM COATED ORAL EVERY 8 HOURS PRN
Status: DISCONTINUED | OUTPATIENT
Start: 2024-07-01 | End: 2024-07-04 | Stop reason: HOSPADM

## 2024-07-01 RX ORDER — ACETAMINOPHEN 650 MG/1
650 SUPPOSITORY RECTAL EVERY 4 HOURS PRN
Status: DISCONTINUED | OUTPATIENT
Start: 2024-07-01 | End: 2024-07-02

## 2024-07-01 RX ORDER — CEFTRIAXONE 1 G/50ML
1 INJECTION, SOLUTION INTRAVENOUS EVERY 24 HOURS
Status: DISCONTINUED | OUTPATIENT
Start: 2024-07-02 | End: 2024-07-02

## 2024-07-01 RX ORDER — TALC
3 POWDER (GRAM) TOPICAL NIGHTLY PRN
Status: DISCONTINUED | OUTPATIENT
Start: 2024-07-01 | End: 2024-07-04 | Stop reason: HOSPADM

## 2024-07-01 RX ORDER — PANTOPRAZOLE SODIUM 40 MG/10ML
40 INJECTION, POWDER, LYOPHILIZED, FOR SOLUTION INTRAVENOUS DAILY
Status: DISCONTINUED | OUTPATIENT
Start: 2024-07-02 | End: 2024-07-02

## 2024-07-01 RX ORDER — HALOPERIDOL 5 MG/ML
5 INJECTION INTRAMUSCULAR ONCE
Status: COMPLETED | OUTPATIENT
Start: 2024-07-01 | End: 2024-07-01

## 2024-07-01 SDOH — SOCIAL STABILITY: SOCIAL INSECURITY: WERE YOU ABLE TO COMPLETE ALL THE BEHAVIORAL HEALTH SCREENINGS?: NO

## 2024-07-01 ASSESSMENT — ACTIVITIES OF DAILY LIVING (ADL)
TOILETING: DEPENDENT
WALKS IN HOME: UNABLE TO ASSESS
JUDGMENT_ADEQUATE_SAFELY_COMPLETE_DAILY_ACTIVITIES: NO
HEARING - LEFT EAR: FUNCTIONAL
ADEQUATE_TO_COMPLETE_ADL: NO
FEEDING YOURSELF: NEEDS ASSISTANCE
GROOMING: NEEDS ASSISTANCE
BATHING: DEPENDENT
PATIENT'S MEMORY ADEQUATE TO SAFELY COMPLETE DAILY ACTIVITIES?: NO
HEARING - RIGHT EAR: FUNCTIONAL
DRESSING YOURSELF: NEEDS ASSISTANCE

## 2024-07-01 ASSESSMENT — COGNITIVE AND FUNCTIONAL STATUS - GENERAL
TOILETING: TOTAL
DAILY ACTIVITIY SCORE: 8
EATING MEALS: A LOT
PATIENT BASELINE BEDBOUND: UNABLE TO ASSESS AT THIS TIME
HELP NEEDED FOR BATHING: TOTAL
DRESSING REGULAR LOWER BODY CLOTHING: TOTAL
PERSONAL GROOMING: TOTAL
DRESSING REGULAR UPPER BODY CLOTHING: A LOT

## 2024-07-01 ASSESSMENT — LIFESTYLE VARIABLES
HOW OFTEN DO YOU HAVE 6 OR MORE DRINKS ON ONE OCCASION: PATIENT UNABLE TO ANSWER
AUDIT-C TOTAL SCORE: -1
TOTAL SCORE: 0
HOW OFTEN DO YOU HAVE A DRINK CONTAINING ALCOHOL: PATIENT UNABLE TO ANSWER
EVER HAD A DRINK FIRST THING IN THE MORNING TO STEADY YOUR NERVES TO GET RID OF A HANGOVER: NO
EVER FELT BAD OR GUILTY ABOUT YOUR DRINKING: NO
HAVE YOU EVER FELT YOU SHOULD CUT DOWN ON YOUR DRINKING: NO
HOW MANY STANDARD DRINKS CONTAINING ALCOHOL DO YOU HAVE ON A TYPICAL DAY: PATIENT UNABLE TO ANSWER
SKIP TO QUESTIONS 9-10: 0
AUDIT-C TOTAL SCORE: -1
HAVE PEOPLE ANNOYED YOU BY CRITICIZING YOUR DRINKING: NO

## 2024-07-01 ASSESSMENT — PAIN - FUNCTIONAL ASSESSMENT
PAIN_FUNCTIONAL_ASSESSMENT: 0-10

## 2024-07-01 ASSESSMENT — PAIN DESCRIPTION - PROGRESSION: CLINICAL_PROGRESSION: NOT CHANGED

## 2024-07-01 ASSESSMENT — PAIN SCALES - GENERAL: PAINLEVEL_OUTOF10: 0 - NO PAIN

## 2024-07-01 NOTE — ED PROVIDER NOTES
HPI   No chief complaint on file.      Pt is an 84 y/o M w/ a PMHx of  Vascular dementia, CVA, COVID19 (1/19/2022), BPH, Seizures, Paranoid schizophrenia, recurrent falls, and GERD who p/w AMS from [facility] and fever. Ptinet unable to give history but was told by EMS that the patient is normal A&Ox3 and he is much more confused and had a tympanic temp of 102F. No mention of coughing, vomiting, or vomiting by [facility]. Pt is DNRCCA per paperwork from [place].                           No data recorded                   Patient History   No past medical history on file.  No past surgical history on file.  No family history on file.  Social History     Tobacco Use    Smoking status: Not on file    Smokeless tobacco: Not on file   Substance Use Topics    Alcohol use: Not on file    Drug use: Not on file       Physical Exam   ED Triage Vitals   Temp Pulse Resp BP   -- -- -- --      SpO2 Temp src Heart Rate Source Patient Position   -- -- -- --      BP Location FiO2 (%)     -- --       Physical Exam  Constitutional:       General: He is not in acute distress.     Appearance: He is ill-appearing. He is not toxic-appearing.      Comments: Altered but in NAD   HENT:      Mouth/Throat:      Mouth: Mucous membranes are dry.      Comments: Mucus membranes extremely dry  Eyes:      General: No scleral icterus.     Extraocular Movements: Extraocular movements intact.      Pupils: Pupils are equal, round, and reactive to light.   Cardiovascular:      Rate and Rhythm: Normal rate and regular rhythm.      Pulses: Normal pulses.      Heart sounds: No murmur heard.  Pulmonary:      Effort: Pulmonary effort is normal. No respiratory distress.      Breath sounds: Rales present. No wheezing.      Comments: +Rales in right lower lobe  Abdominal:      General: Abdomen is flat. Bowel sounds are normal. There is no distension.      Palpations: Abdomen is soft. There is no mass.      Tenderness: There is no abdominal tenderness.    Musculoskeletal:      Right lower leg: No edema.      Left lower leg: No edema.   Skin:     General: Skin is dry.      Comments: Skin very hot   Neurological:      Mental Status: He is alert.      Comments: Oriented to name and saying unintelligible things  Moving all extremities without defcitis         ED Course & MDM   Diagnoses as of 07/02/24 0058   Sepsis (Multi)       Medical Decision Making  Pt is an 84 y/o M w/ a PMHx of  Vascular dementia, CVA, COVID19 (1/19/2022), BPH, Seizures, Paranoid schizophrenia, recurrent falls, and GERD who p/w AMS and fevers concerning for sepsis due to PNA vs UTI.   Will start on Sepsis pathway and get:  - CBC, CBG, CMP, Lactate, blood cultures x 2, UA & Urine Culture, Trop  - Give 2.6L of Crystalloids (which is just sightly greater than 30cc/kg)  - CXR & EKG  - Give Broad spectrum Abx  - Check MRSA, Flu, and COIVD PCRs  - No need for CT of head as patient with toxic metabolic encephalopathy due to sepsis with underlying dementia and no notable defcitis  - Will need admission  - Patient also noted to be DNRCCA per [place] and paperwork placed in chart    EKG Examined and Independently Interpreted by me:  Normal Sinus Rhythm rate of 92 bpm with some PACs  No STEs or TWIs that or new  No new ST Depressions  No Wellen's, Brugada, or AV blocks  RBB noted which is slightly new      Juan Francisco East MD        Procedure  Procedures     Juan Francisco East MD  07/01/24 1910       Juan Francisco East MD  07/02/24 0058

## 2024-07-02 ENCOUNTER — APPOINTMENT (OUTPATIENT)
Dept: CARDIOLOGY | Facility: HOSPITAL | Age: 84
End: 2024-07-02
Payer: COMMERCIAL

## 2024-07-02 ENCOUNTER — HOSPITAL ENCOUNTER (INPATIENT)
Dept: CARDIOLOGY | Facility: HOSPITAL | Age: 84
Discharge: HOME | End: 2024-07-02
Payer: COMMERCIAL

## 2024-07-02 LAB
ALBUMIN SERPL BCP-MCNC: 3.1 G/DL (ref 3.4–5)
ALP SERPL-CCNC: 81 U/L (ref 33–136)
ALT SERPL W P-5'-P-CCNC: 53 U/L (ref 10–52)
ANION GAP SERPL CALC-SCNC: 11 MMOL/L (ref 10–20)
AST SERPL W P-5'-P-CCNC: 213 U/L (ref 9–39)
ATRIAL RATE: 83 BPM
ATRIAL RATE: 85 BPM
ATRIAL RATE: 92 BPM
ATRIAL RATE: 95 BPM
ATRIAL RATE: 97 BPM
BASOPHILS # BLD AUTO: 0.01 X10*3/UL (ref 0–0.1)
BASOPHILS NFR BLD AUTO: 0.2 %
BILIRUB SERPL-MCNC: 0.6 MG/DL (ref 0–1.2)
BUN SERPL-MCNC: 27 MG/DL (ref 6–23)
CALCIUM SERPL-MCNC: 8.1 MG/DL (ref 8.6–10.3)
CARDIAC TROPONIN I PNL SERPL HS: 432 NG/L (ref 0–20)
CHLORIDE SERPL-SCNC: 104 MMOL/L (ref 98–107)
CO2 SERPL-SCNC: 24 MMOL/L (ref 21–32)
CREAT SERPL-MCNC: 1.03 MG/DL (ref 0.5–1.3)
EGFRCR SERPLBLD CKD-EPI 2021: 72 ML/MIN/1.73M*2
EOSINOPHIL # BLD AUTO: 0 X10*3/UL (ref 0–0.4)
EOSINOPHIL NFR BLD AUTO: 0 %
ERYTHROCYTE [DISTWIDTH] IN BLOOD BY AUTOMATED COUNT: 13.4 % (ref 11.5–14.5)
GLUCOSE SERPL-MCNC: 110 MG/DL (ref 74–99)
HCT VFR BLD AUTO: 36.5 % (ref 41–52)
HGB BLD-MCNC: 12.2 G/DL (ref 13.5–17.5)
HOLD SPECIMEN: NORMAL
IMM GRANULOCYTES # BLD AUTO: 0.04 X10*3/UL (ref 0–0.5)
IMM GRANULOCYTES NFR BLD AUTO: 0.7 % (ref 0–0.9)
LYMPHOCYTES # BLD AUTO: 0.34 X10*3/UL (ref 0.8–3)
LYMPHOCYTES NFR BLD AUTO: 6 %
MAGNESIUM SERPL-MCNC: 1.51 MG/DL (ref 1.6–2.4)
MCH RBC QN AUTO: 31.4 PG (ref 26–34)
MCHC RBC AUTO-ENTMCNC: 33.4 G/DL (ref 32–36)
MCV RBC AUTO: 94 FL (ref 80–100)
MONOCYTES # BLD AUTO: 0.19 X10*3/UL (ref 0.05–0.8)
MONOCYTES NFR BLD AUTO: 3.3 %
NEUTROPHILS # BLD AUTO: 5.1 X10*3/UL (ref 1.6–5.5)
NEUTROPHILS NFR BLD AUTO: 89.8 %
NRBC BLD-RTO: 0 /100 WBCS (ref 0–0)
P AXIS: -15 DEGREES
P AXIS: 108 DEGREES
P AXIS: 29 DEGREES
P AXIS: 66 DEGREES
P AXIS: 68 DEGREES
P OFFSET: 156 MS
P OFFSET: 177 MS
P OFFSET: 185 MS
P OFFSET: 189 MS
P OFFSET: 202 MS
P ONSET: 111 MS
P ONSET: 123 MS
P ONSET: 126 MS
P ONSET: 127 MS
P ONSET: 147 MS
PLATELET # BLD AUTO: 150 X10*3/UL (ref 150–450)
POTASSIUM SERPL-SCNC: 3.5 MMOL/L (ref 3.5–5.3)
PR INTERVAL: 150 MS
PR INTERVAL: 188 MS
PR INTERVAL: 190 MS
PR INTERVAL: 192 MS
PR INTERVAL: 204 MS
PROT SERPL-MCNC: 5.9 G/DL (ref 6.4–8.2)
Q ONSET: 213 MS
Q ONSET: 217 MS
Q ONSET: 222 MS
QRS COUNT: 14 BEATS
QRS COUNT: 14 BEATS
QRS COUNT: 15 BEATS
QRS COUNT: 15 BEATS
QRS COUNT: 16 BEATS
QRS DURATION: 100 MS
QRS DURATION: 102 MS
QRS DURATION: 104 MS
QRS DURATION: 110 MS
QRS DURATION: 94 MS
QT INTERVAL: 336 MS
QT INTERVAL: 344 MS
QT INTERVAL: 346 MS
QT INTERVAL: 350 MS
QT INTERVAL: 352 MS
QTC CALCULATION(BAZETT): 413 MS
QTC CALCULATION(BAZETT): 415 MS
QTC CALCULATION(BAZETT): 416 MS
QTC CALCULATION(BAZETT): 434 MS
QTC CALCULATION(BAZETT): 436 MS
QTC FREDERICIA: 387 MS
QTC FREDERICIA: 392 MS
QTC FREDERICIA: 393 MS
QTC FREDERICIA: 403 MS
QTC FREDERICIA: 403 MS
R AXIS: 12 DEGREES
R AXIS: 14 DEGREES
R AXIS: 17 DEGREES
R AXIS: 18 DEGREES
R AXIS: 42 DEGREES
RBC # BLD AUTO: 3.88 X10*6/UL (ref 4.5–5.9)
SODIUM SERPL-SCNC: 135 MMOL/L (ref 136–145)
T AXIS: 51 DEGREES
T AXIS: 76 DEGREES
T AXIS: 76 DEGREES
T AXIS: 81 DEGREES
T AXIS: 93 DEGREES
T OFFSET: 388 MS
T OFFSET: 389 MS
T OFFSET: 390 MS
T OFFSET: 395 MS
T OFFSET: 398 MS
VENTRICULAR RATE: 83 BPM
VENTRICULAR RATE: 85 BPM
VENTRICULAR RATE: 92 BPM
VENTRICULAR RATE: 95 BPM
VENTRICULAR RATE: 97 BPM
WBC # BLD AUTO: 5.7 X10*3/UL (ref 4.4–11.3)

## 2024-07-02 PROCEDURE — 2500000004 HC RX 250 GENERAL PHARMACY W/ HCPCS (ALT 636 FOR OP/ED): Performed by: STUDENT IN AN ORGANIZED HEALTH CARE EDUCATION/TRAINING PROGRAM

## 2024-07-02 PROCEDURE — 1200000002 HC GENERAL ROOM WITH TELEMETRY DAILY

## 2024-07-02 PROCEDURE — 93005 ELECTROCARDIOGRAM TRACING: CPT

## 2024-07-02 PROCEDURE — C9113 INJ PANTOPRAZOLE SODIUM, VIA: HCPCS | Performed by: NURSE PRACTITIONER

## 2024-07-02 PROCEDURE — 93010 ELECTROCARDIOGRAM REPORT: CPT | Performed by: INTERNAL MEDICINE

## 2024-07-02 PROCEDURE — 85025 COMPLETE CBC W/AUTO DIFF WBC: CPT | Performed by: NURSE PRACTITIONER

## 2024-07-02 PROCEDURE — 84484 ASSAY OF TROPONIN QUANT: CPT | Performed by: STUDENT IN AN ORGANIZED HEALTH CARE EDUCATION/TRAINING PROGRAM

## 2024-07-02 PROCEDURE — 2500000004 HC RX 250 GENERAL PHARMACY W/ HCPCS (ALT 636 FOR OP/ED): Performed by: NURSE PRACTITIONER

## 2024-07-02 PROCEDURE — 97161 PT EVAL LOW COMPLEX 20 MIN: CPT | Mod: GP | Performed by: PHYSICAL THERAPIST

## 2024-07-02 PROCEDURE — 97165 OT EVAL LOW COMPLEX 30 MIN: CPT | Mod: GO

## 2024-07-02 PROCEDURE — 99233 SBSQ HOSP IP/OBS HIGH 50: CPT | Performed by: STUDENT IN AN ORGANIZED HEALTH CARE EDUCATION/TRAINING PROGRAM

## 2024-07-02 PROCEDURE — 36415 COLL VENOUS BLD VENIPUNCTURE: CPT | Performed by: NURSE PRACTITIONER

## 2024-07-02 PROCEDURE — 80053 COMPREHEN METABOLIC PANEL: CPT | Performed by: NURSE PRACTITIONER

## 2024-07-02 PROCEDURE — 83735 ASSAY OF MAGNESIUM: CPT | Performed by: NURSE PRACTITIONER

## 2024-07-02 PROCEDURE — 2500000001 HC RX 250 WO HCPCS SELF ADMINISTERED DRUGS (ALT 637 FOR MEDICARE OP): Performed by: STUDENT IN AN ORGANIZED HEALTH CARE EDUCATION/TRAINING PROGRAM

## 2024-07-02 RX ORDER — ACETAMINOPHEN 325 MG/1
650 TABLET ORAL EVERY 6 HOURS PRN
COMMUNITY

## 2024-07-02 RX ORDER — LACTULOSE 10 G/15ML
10 SOLUTION ORAL DAILY
Status: DISCONTINUED | OUTPATIENT
Start: 2024-07-02 | End: 2024-07-04 | Stop reason: HOSPADM

## 2024-07-02 RX ORDER — ASPIRIN 81 MG/1
81 TABLET ORAL DAILY
Status: DISCONTINUED | OUTPATIENT
Start: 2024-07-03 | End: 2024-07-04 | Stop reason: HOSPADM

## 2024-07-02 RX ORDER — HALOPERIDOL 5 MG/ML
5 INJECTION INTRAMUSCULAR ONCE
Status: COMPLETED | OUTPATIENT
Start: 2024-07-02 | End: 2024-07-02

## 2024-07-02 RX ORDER — LANOLIN ALCOHOL/MO/W.PET/CERES
100 CREAM (GRAM) TOPICAL DAILY
COMMUNITY

## 2024-07-02 RX ORDER — ASPIRIN 81 MG/1
81 TABLET ORAL DAILY
COMMUNITY

## 2024-07-02 RX ORDER — FINASTERIDE 5 MG/1
5 TABLET, FILM COATED ORAL NIGHTLY
Status: DISCONTINUED | OUTPATIENT
Start: 2024-07-02 | End: 2024-07-04 | Stop reason: HOSPADM

## 2024-07-02 RX ORDER — LAMOTRIGINE 200 MG/1
400 TABLET ORAL NIGHTLY
COMMUNITY

## 2024-07-02 RX ORDER — FLUDROCORTISONE ACETATE 0.1 MG/1
0.2 TABLET ORAL DAILY
COMMUNITY

## 2024-07-02 RX ORDER — PHENYTOIN SODIUM 100 MG/1
200 CAPSULE, EXTENDED RELEASE ORAL 2 TIMES DAILY
Status: DISCONTINUED | OUTPATIENT
Start: 2024-07-02 | End: 2024-07-04 | Stop reason: HOSPADM

## 2024-07-02 RX ORDER — SERTRALINE HYDROCHLORIDE 50 MG/1
100 TABLET, FILM COATED ORAL DAILY
Status: DISCONTINUED | OUTPATIENT
Start: 2024-07-02 | End: 2024-07-04 | Stop reason: HOSPADM

## 2024-07-02 RX ORDER — ONDANSETRON 4 MG/1
4 TABLET, FILM COATED ORAL EVERY 8 HOURS PRN
COMMUNITY

## 2024-07-02 RX ORDER — AMOXICILLIN 250 MG
1 CAPSULE ORAL DAILY
COMMUNITY

## 2024-07-02 RX ORDER — POLYETHYLENE GLYCOL 3350 17 G/17G
17 POWDER, FOR SOLUTION ORAL DAILY
COMMUNITY

## 2024-07-02 RX ORDER — LAMOTRIGINE 100 MG/1
400 TABLET ORAL DAILY
Status: DISCONTINUED | OUTPATIENT
Start: 2024-07-03 | End: 2024-07-04 | Stop reason: HOSPADM

## 2024-07-02 RX ORDER — MAGNESIUM SULFATE HEPTAHYDRATE 40 MG/ML
4 INJECTION, SOLUTION INTRAVENOUS ONCE
Status: COMPLETED | OUTPATIENT
Start: 2024-07-02 | End: 2024-07-02

## 2024-07-02 RX ORDER — SERTRALINE HYDROCHLORIDE 100 MG/1
100 TABLET, FILM COATED ORAL DAILY
COMMUNITY

## 2024-07-02 RX ORDER — ACETAMINOPHEN, DIPHENHYDRAMINE HCL, PHENYLEPHRINE HCL 325; 25; 5 MG/1; MG/1; MG/1
10 TABLET ORAL NIGHTLY
COMMUNITY

## 2024-07-02 RX ORDER — OLANZAPINE 2.5 MG/1
17.5 TABLET ORAL NIGHTLY
Status: DISCONTINUED | OUTPATIENT
Start: 2024-07-02 | End: 2024-07-04 | Stop reason: HOSPADM

## 2024-07-02 RX ORDER — LAMOTRIGINE 200 MG/1
400 TABLET ORAL DAILY
COMMUNITY

## 2024-07-02 RX ORDER — FINASTERIDE 5 MG/1
5 TABLET, FILM COATED ORAL NIGHTLY
COMMUNITY

## 2024-07-02 RX ORDER — ACETAMINOPHEN 650 MG/1
650 SUPPOSITORY RECTAL EVERY 4 HOURS PRN
Status: DISCONTINUED | OUTPATIENT
Start: 2024-07-02 | End: 2024-07-04 | Stop reason: HOSPADM

## 2024-07-02 RX ORDER — LOPERAMIDE HCL 2 MG
2 TABLET ORAL 4 TIMES DAILY PRN
COMMUNITY

## 2024-07-02 RX ORDER — CHOLECALCIFEROL (VITAMIN D3) 50 MCG
100 TABLET ORAL DAILY
COMMUNITY

## 2024-07-02 RX ORDER — ACETAMINOPHEN 325 MG/1
650 TABLET ORAL EVERY 4 HOURS PRN
Status: DISCONTINUED | OUTPATIENT
Start: 2024-07-02 | End: 2024-07-04 | Stop reason: HOSPADM

## 2024-07-02 RX ORDER — FLUDROCORTISONE ACETATE 0.1 MG/1
0.2 TABLET ORAL DAILY
Status: DISCONTINUED | OUTPATIENT
Start: 2024-07-02 | End: 2024-07-04 | Stop reason: HOSPADM

## 2024-07-02 RX ORDER — MECLIZINE HYDROCHLORIDE 25 MG/1
12.5 TABLET ORAL 2 TIMES DAILY
COMMUNITY

## 2024-07-02 RX ORDER — FOLIC ACID 1 MG/1
1 TABLET ORAL DAILY
Status: DISCONTINUED | OUTPATIENT
Start: 2024-07-02 | End: 2024-07-04 | Stop reason: HOSPADM

## 2024-07-02 RX ORDER — FOLIC ACID 1 MG/1
1 TABLET ORAL DAILY
COMMUNITY

## 2024-07-02 RX ORDER — LANOLIN ALCOHOL/MO/W.PET/CERES
100 CREAM (GRAM) TOPICAL DAILY
Status: DISCONTINUED | OUTPATIENT
Start: 2024-07-03 | End: 2024-07-04 | Stop reason: HOSPADM

## 2024-07-02 RX ORDER — OXYBUTYNIN CHLORIDE 5 MG/1
5 TABLET, EXTENDED RELEASE ORAL NIGHTLY
COMMUNITY

## 2024-07-02 RX ORDER — ENOXAPARIN SODIUM 100 MG/ML
40 INJECTION SUBCUTANEOUS EVERY 24 HOURS
Status: DISCONTINUED | OUTPATIENT
Start: 2024-07-03 | End: 2024-07-04 | Stop reason: HOSPADM

## 2024-07-02 RX ORDER — LACTULOSE 10 G/15ML
10 SOLUTION ORAL DAILY
COMMUNITY

## 2024-07-02 RX ORDER — PHENYTOIN SODIUM 200 MG/1
200 CAPSULE, EXTENDED RELEASE ORAL 2 TIMES DAILY
COMMUNITY

## 2024-07-02 RX ORDER — LAMOTRIGINE 100 MG/1
400 TABLET ORAL NIGHTLY
Status: DISCONTINUED | OUTPATIENT
Start: 2024-07-02 | End: 2024-07-04 | Stop reason: HOSPADM

## 2024-07-02 RX ORDER — NYSTATIN 100000 [USP'U]/G
1 POWDER TOPICAL DAILY
COMMUNITY

## 2024-07-02 RX ORDER — FAMOTIDINE 20 MG/1
20 TABLET, FILM COATED ORAL 2 TIMES DAILY
COMMUNITY

## 2024-07-02 RX ORDER — OLANZAPINE 5 MG/1
17.5 TABLET ORAL NIGHTLY
COMMUNITY

## 2024-07-02 RX ORDER — ACETAMINOPHEN 160 MG/5ML
650 SOLUTION ORAL EVERY 4 HOURS PRN
Status: DISCONTINUED | OUTPATIENT
Start: 2024-07-02 | End: 2024-07-04 | Stop reason: HOSPADM

## 2024-07-02 ASSESSMENT — COGNITIVE AND FUNCTIONAL STATUS - GENERAL
WALKING IN HOSPITAL ROOM: TOTAL
WALKING IN HOSPITAL ROOM: TOTAL
MOBILITY SCORE: 9
PERSONAL GROOMING: A LOT
TURNING FROM BACK TO SIDE WHILE IN FLAT BAD: A LOT
DRESSING REGULAR LOWER BODY CLOTHING: A LOT
TURNING FROM BACK TO SIDE WHILE IN FLAT BAD: A LOT
MOVING TO AND FROM BED TO CHAIR: TOTAL
EATING MEALS: A LOT
MOVING FROM LYING ON BACK TO SITTING ON SIDE OF FLAT BED WITH BEDRAILS: A LITTLE
MOBILITY SCORE: 8
DRESSING REGULAR UPPER BODY CLOTHING: A LOT
DRESSING REGULAR UPPER BODY CLOTHING: A LOT
PERSONAL GROOMING: TOTAL
HELP NEEDED FOR BATHING: A LOT
TOILETING: TOTAL
DRESSING REGULAR UPPER BODY CLOTHING: A LOT
MOBILITY SCORE: 9
DAILY ACTIVITIY SCORE: 8
EATING MEALS: A LOT
TOILETING: A LOT
STANDING UP FROM CHAIR USING ARMS: TOTAL
DAILY ACTIVITIY SCORE: 10
MOVING TO AND FROM BED TO CHAIR: TOTAL
TURNING FROM BACK TO SIDE WHILE IN FLAT BAD: A LOT
CLIMB 3 TO 5 STEPS WITH RAILING: TOTAL
STANDING UP FROM CHAIR USING ARMS: TOTAL
DAILY ACTIVITIY SCORE: 13
CLIMB 3 TO 5 STEPS WITH RAILING: TOTAL
HELP NEEDED FOR BATHING: TOTAL
MOVING FROM LYING ON BACK TO SITTING ON SIDE OF FLAT BED WITH BEDRAILS: A LOT
MOVING TO AND FROM BED TO CHAIR: TOTAL
MOVING FROM LYING ON BACK TO SITTING ON SIDE OF FLAT BED WITH BEDRAILS: A LOT
STANDING UP FROM CHAIR USING ARMS: A LOT
CLIMB 3 TO 5 STEPS WITH RAILING: TOTAL
DRESSING REGULAR LOWER BODY CLOTHING: A LOT
HELP NEEDED FOR BATHING: A LOT
DRESSING REGULAR LOWER BODY CLOTHING: TOTAL
TOILETING: TOTAL
EATING MEALS: A LITTLE
PERSONAL GROOMING: TOTAL
WALKING IN HOSPITAL ROOM: TOTAL

## 2024-07-02 ASSESSMENT — PAIN - FUNCTIONAL ASSESSMENT
PAIN_FUNCTIONAL_ASSESSMENT: 0-10
PAIN_FUNCTIONAL_ASSESSMENT: 0-10
PAIN_FUNCTIONAL_ASSESSMENT: WONG-BAKER FACES
PAIN_FUNCTIONAL_ASSESSMENT: 0-10

## 2024-07-02 ASSESSMENT — PAIN SCALES - GENERAL
PAINLEVEL_OUTOF10: 0 - NO PAIN

## 2024-07-02 ASSESSMENT — ACTIVITIES OF DAILY LIVING (ADL)
LACK_OF_TRANSPORTATION: PATIENT UNABLE TO ANSWER
BATHING_ASSISTANCE: MAXIMAL

## 2024-07-02 NOTE — PROGRESS NOTES
Pt. Admitted from [facility], referral sent.  They confirm he is a long term resident and bedhold, can return when ready.

## 2024-07-02 NOTE — H&P
" Medical Group History and Physical    ASSESSMENT & PLAN:     NSTEMI  Elevated Troponin [400s]  Likely demand ischemia, no c/o chest pain, palpitations, or shortness of breath.  - repeat trop pending - if rising will add heparin infusion  - EKG shows new RBBB per ER interpretation, not available in [facility] at time of admission  - tele and serial EKGs  - Consider cardiology consult in AM    Toxic Metabolic encephalopathy  Sepsis  ОЛЕГ  UTI  Painless hematuria  Presented from nursing facility for acute changes in mentation. Baseline mentation is A+Ox3 per prior records although he has been seen multiple times with AMS changes and has paranoid schizophrenia as well as dementia with behaviors; [+] hypoxia and Rales in RLL on exam, tolerating 2L NC, poor circulation.  - initially treated with broad spectrum antibiotics for suspected Pneumonia - final CXR and Lab findings make this less likely, no c/o dyspnea, [+] hypoxia w/ Rales in the RLL; CXR reading shows hypoinflation of lungs.  - Adjusted antibiotics -> started ceftriaxone for UTI  - IVF hydration  - received 30cc/kg [2.6L LR] in ER per sepsis protocol  - trend BC  - Baseline renal function: SCr ~ 0.80, on admission SCr 1.21, GFR 59; K 3.9, Mag [not available]  - VS q4hr  - med rec needed    GERD  - protonix    Other Chronic History  Paranoid Schizophrenia  Seizures   Vascular Dementia  CVA    VTE Prophylaxis: Lovenox     Sveta Miles, APRN-CNP    HISTORY OF PRESENT ILLNESS:   Chief Complaint: altered mental status    History Of Present Illness:    Willian Vela is a 83 y.o. male with a significant past medical history of Vascular dementia, CVA, COVID19 (1/19/2022), BPH, Seizures, Paranoid schizophrenia, recurrent falls, and GERD who presented  with AMS.     Pt is a poor historian and unable to answer questions asked mumbling and not making sense with statements made then says \"I'm sorry I'm really confused now\" baseline mentation is A+Ox3 per SNF and prior " records; he is very hard of hearing, reading lips when I ask questions. Upon exam he is alert and interactive but has word finding difficulty likely related to the acute infection and toxic metabolic encephalopathy; anticipate improvement over the next 24 hours, no family at bedside to assist with history taking.  Lab work reveals no evidence of leukocytosis however neutrophil count is elevated 7.68, H/H stable, platelets 159, INR 1.2, K 3.9, BUN 28, creatinine 1.21, GFR 59  a slightly elevated renal function when compared to baseline however still within normal limits, flu and COVID swabs are negative, blood cultures obtained results pending, UA shows evidence suggestive of UTI with hematuria -urine RBC >20, urine WBC >50, leukocyte esterase [+] nitrate negative.     VS did show evidence of fever temp 101 F and hypotension 98/56, IVF and Antibiotics for suspected pneumonia were started, elevated trop 400s likely demand ischemia in the setting of sepsis r/t UTI, however repeat trop is pending at this time; BP is stable, wearing 2L NC for hypoxia, ready for admission to General Medicine for the management of NSTEMI, toxic metabolic encephalopathy, sepsis, and UTI.    Review of systems: 10 point review of systems is otherwise negative except as mentioned above.    PAST HISTORIES:       Past Medical History:  Medical Problems       Problem List       * (Principal) Toxic metabolic encephalopathy    Overview Signed 7/1/2024  9:03 PM by SRINIVASAN Mckinney     AMS from facility - [facility]; baseline dementia w behaviors, schizophrenia, and seizures; UA + fpr UTI         Sepsis (Multi)    Elevated troponin    Overview Signed 7/1/2024  9:04 PM by SRINIVASAN Mckinney     Trop 400s on admission, no c/o CP, EKG ordered, repeat labs ordered - pending results, likely elevation d/t demand ischemia with concurrent infection/sepsis          Fracture of clavicle    Orthostatic hypotension    Urinary tract infection     Vascular dementia with behavior disturbance (Multi)           Past Surgical History:  History reviewed. No pertinent surgical history.       Social History:  He has no history on file for tobacco use, alcohol use, and drug use.    Family History:  No family history on file.     Allergies:  Tamsulosin    OBJECTIVE:       Last Recorded Vitals:  Vitals:    07/01/24 2018 07/01/24 2019 07/01/24 2049 07/01/24 2050   BP: 136/59  132/58    BP Location:       Patient Position:       Pulse: 92 88 86 86   Resp:  (!) 23  (!) 21   Temp:       TempSrc:       SpO2:  97%  99%   Weight:       Height:           Last I/O:  No intake/output data recorded.    Physical Exam  Vitals and nursing note reviewed.   Constitutional:       Appearance: He is ill-appearing.   HENT:      Head: Normocephalic.      Comments: Bruising noted on forehead     Ears:      Comments: Stebbins     Nose: Nose normal.      Mouth/Throat:      Mouth: Mucous membranes are dry.      Pharynx: Oropharynx is clear.   Eyes:      Extraocular Movements: Extraocular movements intact.      Conjunctiva/sclera: Conjunctivae normal.      Pupils: Pupils are equal, round, and reactive to light.   Cardiovascular:      Rate and Rhythm: Normal rate and regular rhythm.   Pulmonary:      Effort: Pulmonary effort is normal.      Breath sounds: Rhonchi present.      Comments: 2L NC, SpO2 93%  Abdominal:      General: Abdomen is flat. Bowel sounds are normal.      Palpations: Abdomen is soft.   Musculoskeletal:         General: Normal range of motion.      Cervical back: Normal range of motion and neck supple.   Skin:     General: Skin is dry.      Capillary Refill: Capillary refill takes 2 to 3 seconds.      Coloration: Skin is pale.      Findings: Bruising present.   Neurological:      Mental Status: He is alert. He is disoriented.      Motor: Weakness present.      Comments: Unable to answer questions asked, party d/t hearing deficit and partly d/t AMS   Psychiatric:         Mood and  Affect: Mood normal.         Behavior: Behavior normal.      Comments: Paranoid schizophrenia, vascular dementia with behaviors, baseline mentation reported as A+O x 3           Scheduled Medications  azithromycin, 500 mg, intravenous, Once  lactated Ringer's, 2,600 mL, intravenous, Once  vancomycin, 2 g, intravenous, Once      PRN Medications    Continuous Medications       Outpatient Medications:  Prior to Admission medications    Not on File       LABS AND IMAGING:     Labs:  Results for orders placed or performed during the hospital encounter of 07/01/24 (from the past 24 hour(s))   SST TOP   Result Value Ref Range    Extra Tube Hold for add-ons.    CBC and Auto Differential   Result Value Ref Range    WBC 8.2 4.4 - 11.3 x10*3/uL    nRBC 0.0 0.0 - 0.0 /100 WBCs    RBC 4.11 (L) 4.50 - 5.90 x10*6/uL    Hemoglobin 12.9 (L) 13.5 - 17.5 g/dL    Hematocrit 40.0 (L) 41.0 - 52.0 %    MCV 97 80 - 100 fL    MCH 31.4 26.0 - 34.0 pg    MCHC 32.3 32.0 - 36.0 g/dL    RDW 13.4 11.5 - 14.5 %    Platelets 159 150 - 450 x10*3/uL    Neutrophils % 93.2 40.0 - 80.0 %    Immature Granulocytes %, Automated 0.4 0.0 - 0.9 %    Lymphocytes % 3.5 13.0 - 44.0 %    Monocytes % 2.8 2.0 - 10.0 %    Eosinophils % 0.0 0.0 - 6.0 %    Basophils % 0.1 0.0 - 2.0 %    Neutrophils Absolute 7.68 (H) 1.60 - 5.50 x10*3/uL    Immature Granulocytes Absolute, Automated 0.03 0.00 - 0.50 x10*3/uL    Lymphocytes Absolute 0.29 (L) 0.80 - 3.00 x10*3/uL    Monocytes Absolute 0.23 0.05 - 0.80 x10*3/uL    Eosinophils Absolute 0.00 0.00 - 0.40 x10*3/uL    Basophils Absolute 0.01 0.00 - 0.10 x10*3/uL   Comprehensive Metabolic Panel   Result Value Ref Range    Glucose 128 (H) 74 - 99 mg/dL    Sodium 139 136 - 145 mmol/L    Potassium 3.9 3.5 - 5.3 mmol/L    Chloride 105 98 - 107 mmol/L    Bicarbonate 27 21 - 32 mmol/L    Anion Gap 11 10 - 20 mmol/L    Urea Nitrogen 28 (H) 6 - 23 mg/dL    Creatinine 1.21 0.50 - 1.30 mg/dL    eGFR 59 (L) >60 mL/min/1.73m*2    Calcium  9.1 8.6 - 10.3 mg/dL    Albumin 3.8 3.4 - 5.0 g/dL    Alkaline Phosphatase 101 33 - 136 U/L    Total Protein 6.9 6.4 - 8.2 g/dL     (H) 9 - 39 U/L    Bilirubin, Total 0.6 0.0 - 1.2 mg/dL    ALT 31 10 - 52 U/L   Lactate   Result Value Ref Range    Lactate 1.0 0.4 - 2.0 mmol/L   Blood Gas Venous Full Panel   Result Value Ref Range    POCT pH, Venous 7.37 7.33 - 7.43 pH    POCT pCO2, Venous 48 41 - 51 mm Hg    POCT pO2, Venous 22 (L) 35 - 45 mm Hg    POCT SO2, Venous 29 (L) 45 - 75 %    POCT Oxy Hemoglobin, Venous 28.6 (L) 45.0 - 75.0 %    POCT Hematocrit Calculated, Venous 39.0 (L) 41.0 - 52.0 %    POCT Sodium, Venous 139 136 - 145 mmol/L    POCT Potassium, Venous 3.9 3.5 - 5.3 mmol/L    POCT Chloride, Venous 103 98 - 107 mmol/L    POCT Ionized Calicum, Venous 1.19 1.10 - 1.33 mmol/L    POCT Glucose, Venous 129 (H) 74 - 99 mg/dL    POCT Lactate, Venous 1.3 0.4 - 2.0 mmol/L    POCT Base Excess, Venous 1.7 -2.0 - 3.0 mmol/L    POCT HCO3 Calculated, Venous 27.7 (H) 22.0 - 26.0 mmol/L    POCT Hemoglobin, Venous 13.0 (L) 13.5 - 17.5 g/dL    POCT Anion Gap, Venous 12.0 10.0 - 25.0 mmol/L    Patient Temperature      FiO2 0 %   Coagulation Screen   Result Value Ref Range    Protime 13.5 (H) 9.8 - 12.8 seconds    INR 1.2 (H) 0.9 - 1.1    aPTT 29 27 - 38 seconds   Troponin I, High Sensitivity, Initial   Result Value Ref Range    Troponin I, High Sensitivity 419 (HH) 0 - 20 ng/L   Lavender Top   Result Value Ref Range    Extra Tube Hold for add-ons.    MRSA Surveillance for Vancomycin De-escalation, PCR    Specimen: Anterior Nares; Swab   Result Value Ref Range    MRSA PCR Not Detected Not Detected   Sars-CoV-2 PCR   Result Value Ref Range    Coronavirus 2019, PCR Not Detected Not Detected   Influenza A, and B PCR   Result Value Ref Range    Flu A Result Not Detected Not Detected    Flu B Result Not Detected Not Detected   Urinalysis with Reflex Culture and Microscopic   Result Value Ref Range    Color, Urine Light-Orange  (N) Light-Yellow, Yellow, Dark-Yellow    Appearance, Urine Ex.Turbid (N) Clear    Specific Gravity, Urine 1.020 1.005 - 1.035    pH, Urine 7.5 5.0, 5.5, 6.0, 6.5, 7.0, 7.5, 8.0    Protein, Urine 100 (2+) (A) NEGATIVE, 10 (TRACE), 20 (TRACE) mg/dL    Glucose, Urine Normal Normal mg/dL    Blood, Urine OVER (3+) (A) NEGATIVE    Ketones, Urine NEGATIVE NEGATIVE mg/dL    Bilirubin, Urine NEGATIVE NEGATIVE    Urobilinogen, Urine 2 (1+) (A) Normal mg/dL    Nitrite, Urine NEGATIVE NEGATIVE    Leukocyte Esterase, Urine 500 Rasta/µL (A) NEGATIVE   Microscopic Only, Urine   Result Value Ref Range    WBC, Urine >50 (A) 1-5, NONE /HPF    WBC Clumps, Urine MANY Reference range not established. /HPF    RBC, Urine >20 (A) NONE, 1-2, 3-5 /HPF    Transitional Epithelial Cells, Urine 3-5 (1+) Reference range not established. /HPF    Bacteria, Urine 4+ (A) NONE SEEN /HPF        Imaging:  XR chest 1 view   Final Result   Mild hypoinflation with minimal bibasilar atelectasis. Remainder as   above.   Signed by Harjinder Mendieta MD

## 2024-07-02 NOTE — PROGRESS NOTES
Occupational Therapy    Evaluation    Patient Name: Willian Vela  MRN: [id number]  Today's Date: 7/2/2024  Time Calculation  Start Time: 1132  Stop Time: 1149  Time Calculation (min): 17 min        Assessment:  OT Assessment: baseline dementia and assist at baseline. Pt. presents wtih Max A for bed mobility and transfers.  Prognosis: Fair  End of Session Communication: Bedside nurse, PCT/NA/CTA  End of Session Patient Position: Bed, 3 rail up, Alarm on (PCA in room)  OT Assessment Results: Decreased ADL status, Decreased endurance  Prognosis: Fair  Plan:  Treatment Interventions: ADL retraining, Functional transfer training, Endurance training  OT Frequency: 2 times per week  OT Discharge Recommendations: Moderate intensity level of continued care, 24 hr supervision due to cognition  OT - OK to Discharge: Yes (when medically stable/cleared)      Subjective   Current Problem:  1. Sepsis (Multi)          General:  General  Reason for Referral: ADL impairment  Referred By: Jd 7/1 OT/PT  Past Medical History Relevant to Rehab: CVA, GERD, COVID, BPH, seizures, FAlls, vascular dementia, schizophrenia  Family/Caregiver Present: No  Prior to Session Communication: Bedside nurse  Patient Position Received: Bed, 3 rail up, Alarm on (2L 02, telemetry; IV; external male catheter)  General Comment: Pt. is 84 y/o male admitted from [facility] for AMS, fever, and concern for sepsis. (+) UTI. CXR 7/1: minimal bibasilar atelectasis, Troponin 432 upward trending. Na 135  Precautions:  UE Weight Bearing Status:  (Per staff at [facility]: NWB L UE for L clavicular fracture 5/31/2024)  Medical Precautions: Fall precautions    Pain:  Pain Assessment  Pain Assessment: 0-10  0-10 (Numeric) Pain Score:  (Did not rate, but reports jaw pain intermittently)    Objective   Cognition:  Overall Cognitive Status: Impaired at baseline  Orientation Level: Disoriented X4 (unable to answer orienation questions)        Home Living:  Home  Living Comments: Pt. is resident of LTC/dementia unit at [facility]. Pt. unable to report PLOF  Prior Function:  Prior Function Comments: Pt. unable to report info. EMR stated Pt was admitted from [facility]. P.T. called [facility] and saff reported PT. transfers bed <> chair/commode with MIN A but does not amb. Staff also reported Pt. is a NWB L UE for a L clavicle fracture which occuried on 5/31/2024.    ADL:  Eating Assistance: Minimal  Grooming Assistance: Moderate  Bathing Assistance: Maximal  UE Dressing Assistance: Moderate  LE Dressing Assistance: Maximal  Toileting Assistance with Device: Maximal  Activity Tolerance:  Endurance: Decreased tolerance for upright activites  Bed Mobility/Transfers: Bed Mobility  Bed Mobility: Yes  Bed Mobility 1  Bed Mobility 1: Supine to sitting, Sitting to supine  Level of Assistance 1: Maximum assistance  Bed Mobility Comments 1: x2 assist for LEs and trunk to edge of bed as well as sit to sup. dependent to boost    Transfers  Transfer: Yes  Transfer 1  Technique 1: Sit to stand, Stand to sit  Transfer Device 1:  (No AD. hand held assist)  Transfer Level of Assistance 1: Maximum assistance  Trials/Comments 1: x2 to lift, steady, and maintain standing    Sitting Balance:  Static Sitting Balance  Static Sitting-Level of Assistance: Minimum assistance  Standing Balance:  Static Standing Balance  Static Standing-Level of Assistance: Maximum assistance     Strength:  Strength Comments: unable to follow commands to assess, howeveer limited ROM during functional assessment    Extremities: RUE   RUE :  (Not able to formally assess) and LUE   LUE:  (Not able to formally assess)    Outcome Measures:[facility] Daily Activity  Putting on and taking off regular lower body clothing: A lot  Bathing (including washing, rinsing, drying): A lot  Putting on and taking off regular upper body clothing: A lot  Toileting, which includes using toilet, bedpan or urinal: A lot  Taking care  of personal grooming such as brushing teeth: A lot  Eating Meals: A little  Daily Activity - Total Score: 13      Education Documentation  ADL Training, taught by Mae Holt, OT at 7/2/2024  1:04 PM.  Learner: Patient  Readiness: Acceptance  Method: Explanation  Response: Verbalizes Understanding, Needs Reinforcement      Goals:  Encounter Problems       Encounter Problems (Active)       OT Goals       Min A for all functional transfers  (Progressing)       Start:  07/02/24    Expected End:  07/16/24            Pt. will be able to follow one step commands during ADL participation.  (Progressing)       Start:  07/02/24    Expected End:  07/16/24            Fair dyn sitting balance EOB for ADLs/functional activities  (Progressing)       Start:  07/02/24    Expected End:  07/16/24

## 2024-07-02 NOTE — PROGRESS NOTES
Confirmed,  pt is from  LTC and Bedhold. Plan return when medically able.  Currently sitter 1:1.   Care transitions will continue to follow for dc planning needs.

## 2024-07-02 NOTE — PROGRESS NOTES
Medical Group Progress Note  ASSESSMENT & PLAN:     Metabolic encephalopathy  Acute complicated UTI  Sepsis in setting of above with endorgan dysfunction with altered mental status  - Source seems to be UTI, patient at baseline oriented x 2-3 however my exam was only x 1 at max  - Continue Zosyn  - Still having fevers as of this morning  - Follow-up urine culture  - Sepsis criteria: Temperature, respiratory rate, endorgan dysfunction with change in mental status    Elevated troponin  - Likely demand ischemia from above  - Troponin is slowly going up therefore we will continue to trend  - Trend till peak and potential consult to consult cardiology afterwards    Hypomagnesemia  - Replace as indicated    VTE prophylaxis: Enoxaparin subcutaneous      ---Of note, this documentation is completed using the Dragon Dictation system (voice recognition software). There may be spelling and/or grammatical errors that were not corrected prior to final submission.---    Naga Fleming MD    SUBJECTIVE     Patient was seen and examined bedside this morning.  Was confused on my exam, did not really participate in my conversation as well.    OBJECTIVE:     Last Recorded Vitals:  Vitals:    07/02/24 0712 07/02/24 0844 07/02/24 1054 07/02/24 1429   BP: 107/75  106/56 107/59   BP Location:       Patient Position:       Pulse: 86  94 77   Resp:   22    Temp: (!) 38.8 °C (101.8 °F) 37.8 °C (100 °F) 37.3 °C (99.1 °F) 36.9 °C (98.4 °F)   TempSrc:       SpO2: 91%  92% 92%   Weight:       Height:         Last I/O:  I/O last 3 completed shifts:  In: 3190 (38.7 mL/kg) [P.O.:240; IV Piggyback:2950]  Out: 200 (2.4 mL/kg) [Urine:200 (0.1 mL/kg/hr)]  Weight: 82.4 kg     Physical Exam  Vitals reviewed.   Constitutional:       Appearance: He is not ill-appearing.   HENT:      Head: Normocephalic and atraumatic.      Nose: Nose normal.   Eyes:      Extraocular Movements: Extraocular movements intact.      Conjunctiva/sclera: Conjunctivae normal.    Cardiovascular:      Rate and Rhythm: Normal rate and regular rhythm.      Pulses: Normal pulses.      Heart sounds: Normal heart sounds.   Pulmonary:      Effort: Pulmonary effort is normal.      Breath sounds: Normal breath sounds.   Abdominal:      General: Bowel sounds are normal.      Palpations: Abdomen is soft.      Tenderness: There is no abdominal tenderness. There is no guarding.   Musculoskeletal:         General: No swelling or tenderness. Normal range of motion.      Cervical back: Normal range of motion and neck supple.   Neurological:      Mental Status: He is alert. He is disoriented.     Inpatient Medications:  pantoprazole, 40 mg, oral, Daily  piperacillin-tazobactam, 3.375 g, intravenous, q8h   And  sodium chloride, 10 mL, intravenous, q8h    PRN Medications  PRN medications: acetaminophen **OR** acetaminophen **OR** acetaminophen, benzocaine-menthol, melatonin, ondansetron **OR** [DISCONTINUED] ondansetron  Continuous Medications:     LABS AND IMAGING:     Labs:  Results from last 7 days   Lab Units 07/02/24 0710 07/01/24 1912   WBC AUTO x10*3/uL 5.7 8.2   RBC AUTO x10*6/uL 3.88* 4.11*   HEMOGLOBIN g/dL 12.2* 12.9*   HEMATOCRIT % 36.5* 40.0*   MCV fL 94 97   MCH pg 31.4 31.4   MCHC g/dL 33.4 32.3   RDW % 13.4 13.4   PLATELETS AUTO x10*3/uL 150 159     Results from last 7 days   Lab Units 07/02/24  0710 07/01/24  1912   SODIUM mmol/L 135* 139   POTASSIUM mmol/L 3.5 3.9   CHLORIDE mmol/L 104 105   CO2 mmol/L 24 27   BUN mg/dL 27* 28*   CREATININE mg/dL 1.03 1.21   GLUCOSE mg/dL 110* 128*   PROTEIN TOTAL g/dL 5.9* 6.9   CALCIUM mg/dL 8.1* 9.1   BILIRUBIN TOTAL mg/dL 0.6 0.6   ALK PHOS U/L 81 101   AST U/L 213* 142*   ALT U/L 53* 31     Results from last 7 days   Lab Units 07/02/24  0710   MAGNESIUM mg/dL 1.51*     Results from last 7 days   Lab Units 07/02/24  0710 07/01/24 2150 07/01/24 1912   TROPHS ng/L 432* 394* 419*     Imaging:  ECG 12 lead  Sinus rhythm with Premature supraventricular  complexes  Low voltage QRS  Nonspecific T wave abnormality  Abnormal ECG  When compared with ECG of 08-SEP-2023 22:00,  Previous ECG has undetermined rhythm, needs review  Questionable change in QRS axis  Nonspecific T wave abnormality, worse in Lateral leads  See ED provider note for full interpretation and clinical correlation  ECG 12 lead  Sinus rhythm with marked sinus arrhythmia  Low voltage QRS  Nonspecific ST and T wave abnormality  Abnormal ECG  When compared with ECG of 01-JUL-2024 19:01, (unconfirmed)  Premature supraventricular complexes are no longer Present  Nonspecific T wave abnormality now evident in Anterior leads  ECG 12 lead  Sinus rhythm with Premature supraventricular complexes  Low voltage QRS  Nonspecific ST and T wave abnormality  Abnormal ECG  When compared with ECG of 02-JUL-2024 02:37, (unconfirmed)  No significant change was found  ECG 12 lead  Sinus rhythm with Premature supraventricular complexes  Low voltage QRS  Borderline ECG  When compared with ECG of 01-JUL-2024 22:36, (unconfirmed)  Premature supraventricular complexes are now Present  Nonspecific T wave abnormality, improved in Anterolateral leads

## 2024-07-02 NOTE — PROGRESS NOTES
Physical Therapy    Physical Therapy Evaluation    Patient Name: Willian Vela  MRN: [id number]  Today's Date: 7/2/2024   Time Calculation  Start Time: 1131  Stop Time: 1148  Time Calculation (min): 17 min  803/803-A    Assessment/Plan   PT Assessment  PT Assessment Results: Decreased strength, Decreased endurance, Impaired balance, Decreased mobility, Decreased cognition, Impaired judgement, Decreased safety awareness, Impaired hearing  Rehab Prognosis: Fair  Evaluation/Treatment Tolerance: Patient limited by fatigue  Medical Staff Made Aware: Yes  Strengths: Living arrangement secure  Barriers to Participation: Comorbidities  End of Session Communication: Bedside nurse  End of Session Patient Position: Bed, 3 rail up, Up in chair (Call light within reach, staff in room)  IP OR SWING BED PT PLAN  Inpatient or Swing Bed: Inpatient  PT Plan  Treatment/Interventions: Bed mobility, Transfer training, Balance training, Strengthening, Endurance training, Therapeutic exercise  PT Plan: Ongoing PT  PT Frequency: 2 times per week  PT Discharge Recommendations: Moderate intensity level of continued care  PT Recommended Transfer Status: Assist x2, Assistive device  Physical Therapy eval completed per MD requisition. P.T. recommendations as outlined above. Recommend D/C from acute care when medically appropriate as deemed by medical staff.    Subjective       General Visit Information:  General  Reason for Referral: impaired mobility  Referred By: ZACH Miles CNP (PT/OT 7/1)  Past Medical History Relevant to Rehab: includes: CVA, GERD, Covid, BPH, seizures, paranoid schizophrenia, vascular dementia, falls, [facility]  Family/Caregiver Present: No  Prior to Session Communication: Bedside nurse  Patient Position Received: Bed, 3 rail up, Alarm on (staff in room)  Preferred Learning Style: auditory, verbal  General Comment: Pt. is an 84yo who [place] [facility] [place]24 with altered mental status, fever (102°F). In ED, Troponin (7/1)  400s, BP 98/56.    CXR (7/1) (+) bibasilar atelctasis, hypoinflation    Dx: sepsis, toxic metabolic encephalopahty, ОЛЕГ, UTI, hematuria    Home Living:  Home Living  Home Living Comments: Pt. unable to report info. EMR stated Pt was admitted from [facility]. P.T. called [facility] and Pt. is a resident of their dementia unit.    Prior Level of Function:  Prior Function Per Pt/Caregiver Report  Prior Function Comments: Pt. unable to report info. EMR stated Pt was admitted from [facility]. P.T. called [facility] and saff reported PT. transfers bed <> chair/commode with MIN A but does not amb. Staff also reported Pt. is a NWB L UE for a L clavicle fracture which occuried on 5/31/2024.    Precautions:  Precautions  UE Weight Bearing Status:  (Per staff at [facility]: NWB L UE for L clavicular fracture 5/31/2024)  Medical Precautions:  (Actiity order: OOB with A)  Precautions Comment: Per EMR: High fall risk    Objective     Pain:  Pain Assessment  Pain Assessment: 0-10  0-10 (Numeric) Pain Score: 0 - No pain    Cognition:  Cognition  Overall Cognitive Status: Impaired at baseline  Orientation Level: Disoriented X4    General Assessments:  General Observation  General Observation: Tele, 2L O2, Pure[place]   Activity Tolerance  Endurance: Decreased tolerance for upright activites                 Dynamic Sitting Balance  Dynamic Sitting-Comments: Poor+ static and dynamic sitting balance  Dynamic Standing Balance  Dynamic Standing-Comments: Poor static and dynamic standing balance    Functional Assessments:     Bed Mobility  Bed Mobility: Yes  Bed Mobility 1  Bed Mobility 1: Supine to sitting  Level of Assistance 1: Maximum assistance, +2  Bed Mobility Comments 1: A to maneuver LEs over EOB and to elevate trunk from bed  Bed Mobility 2  Bed Mobility  2: Sitting to supine  Level of Assistance 2: Maximum assistance, +2  Bed Mobility Comments 2: A to lift LEs onto bed and to control descent/placement of  trunk  Transfers  Transfer: Yes  Transfer 1  Technique 1: Sit to stand  Transfer Device 1:  (No AD)  Transfer Level of Assistance 1: Maximum assistance, +2  Trials/Comments 1: A for lifting, transferring weight over feet, steadying. VC's for hand placement  Transfers 2  Technique 2: Stand to sit  Transfer Device 2:  (No AD)  Transfer Level of Assistance 2: Maximum assistance, +2  Trials/Comments 2: A to control descent. VC's for hand placement.  Transfers 3  Transfer Device 3:  (No AD)  Ambulation/Gait Training  Ambulation/Gait Training Performed: No (Pt. unable to take any steps.)  Stairs  Stairs: No       Extremity/Trunk Assessments:        RLE   RLE :  (AROM WFL, strength 4-/5)  LLE   LLE :  (AROM WFL, strength 4-/5)    Outcome Measures:     [facility] Basic Mobility  Turning from your back to your side while in a flat bed without using bedrails: A lot  Moving from lying on your back to sitting on the side of a flat bed without using bedrails: A lot  Moving to and from bed to chair (including a wheelchair): Total  Standing up from a chair using your arms (e.g. wheelchair or bedside chair): A lot  To walk in hospital room: Total  Climbing 3-5 steps with railing: Total  Basic Mobility - Total Score: 9                                        Goals:  Encounter Problems       Encounter Problems (Active)       PT Problem       Pt. will transfer supine/sit with MOD A x 1 (Progressing)       Start:  07/02/24    Expected End:  07/16/24            Pt. will transfer sit/stand with MOD A x 1 NWB L UE (Progressing)       Start:  07/02/24    Expected End:  07/16/24            Pt. will complete stand pivot transfers with MOD A x 1 NWB L UE (Not Progressing)       Start:  07/02/24    Expected End:  07/16/24            Pt. will perform 2 x 15 B LE AROM exercises  (Not Progressing)       Start:  07/02/24    Expected End:  07/16/24                   Education Documentation  Mobility Training, taught by Christiano Izquierdo, PT at 7/2/2024  12:58 PM.  Learner: Patient  Readiness: Acceptance  Method: Explanation  Response: Needs Reinforcement, No Evidence of Learning  Comment: Role of PT, transfers, amb, safety, PT POC

## 2024-07-03 LAB
ALBUMIN SERPL BCP-MCNC: 3 G/DL (ref 3.4–5)
ALP SERPL-CCNC: 77 U/L (ref 33–136)
ALT SERPL W P-5'-P-CCNC: 64 U/L (ref 10–52)
ANION GAP SERPL CALC-SCNC: 9 MMOL/L (ref 10–20)
AST SERPL W P-5'-P-CCNC: 186 U/L (ref 9–39)
BASOPHILS # BLD AUTO: 0.01 X10*3/UL (ref 0–0.1)
BASOPHILS NFR BLD AUTO: 0.2 %
BILIRUB SERPL-MCNC: 0.5 MG/DL (ref 0–1.2)
BUN SERPL-MCNC: 27 MG/DL (ref 6–23)
CALCIUM SERPL-MCNC: 7.9 MG/DL (ref 8.6–10.3)
CARDIAC TROPONIN I PNL SERPL HS: 253 NG/L (ref 0–20)
CHLORIDE SERPL-SCNC: 104 MMOL/L (ref 98–107)
CO2 SERPL-SCNC: 24 MMOL/L (ref 21–32)
CREAT SERPL-MCNC: 1.01 MG/DL (ref 0.5–1.3)
EGFRCR SERPLBLD CKD-EPI 2021: 73 ML/MIN/1.73M*2
EOSINOPHIL # BLD AUTO: 0.01 X10*3/UL (ref 0–0.4)
EOSINOPHIL NFR BLD AUTO: 0.2 %
ERYTHROCYTE [DISTWIDTH] IN BLOOD BY AUTOMATED COUNT: 13.2 % (ref 11.5–14.5)
GLUCOSE SERPL-MCNC: 114 MG/DL (ref 74–99)
HCT VFR BLD AUTO: 36.5 % (ref 41–52)
HGB BLD-MCNC: 12.3 G/DL (ref 13.5–17.5)
HOLD SPECIMEN: NORMAL
IMM GRANULOCYTES # BLD AUTO: 0.02 X10*3/UL (ref 0–0.5)
IMM GRANULOCYTES NFR BLD AUTO: 0.5 % (ref 0–0.9)
LYMPHOCYTES # BLD AUTO: 0.56 X10*3/UL (ref 0.8–3)
LYMPHOCYTES NFR BLD AUTO: 12.8 %
MAGNESIUM SERPL-MCNC: 2.12 MG/DL (ref 1.6–2.4)
MCH RBC QN AUTO: 31.2 PG (ref 26–34)
MCHC RBC AUTO-ENTMCNC: 33.7 G/DL (ref 32–36)
MCV RBC AUTO: 93 FL (ref 80–100)
MONOCYTES # BLD AUTO: 0.34 X10*3/UL (ref 0.05–0.8)
MONOCYTES NFR BLD AUTO: 7.8 %
NEUTROPHILS # BLD AUTO: 3.42 X10*3/UL (ref 1.6–5.5)
NEUTROPHILS NFR BLD AUTO: 78.5 %
NRBC BLD-RTO: 0 /100 WBCS (ref 0–0)
PLATELET # BLD AUTO: 147 X10*3/UL (ref 150–450)
POTASSIUM SERPL-SCNC: 3.4 MMOL/L (ref 3.5–5.3)
PROT SERPL-MCNC: 5.7 G/DL (ref 6.4–8.2)
RBC # BLD AUTO: 3.94 X10*6/UL (ref 4.5–5.9)
SODIUM SERPL-SCNC: 134 MMOL/L (ref 136–145)
WBC # BLD AUTO: 4.4 X10*3/UL (ref 4.4–11.3)

## 2024-07-03 PROCEDURE — 84484 ASSAY OF TROPONIN QUANT: CPT | Performed by: STUDENT IN AN ORGANIZED HEALTH CARE EDUCATION/TRAINING PROGRAM

## 2024-07-03 PROCEDURE — 83735 ASSAY OF MAGNESIUM: CPT | Performed by: NURSE PRACTITIONER

## 2024-07-03 PROCEDURE — 2500000004 HC RX 250 GENERAL PHARMACY W/ HCPCS (ALT 636 FOR OP/ED): Performed by: STUDENT IN AN ORGANIZED HEALTH CARE EDUCATION/TRAINING PROGRAM

## 2024-07-03 PROCEDURE — 2500000001 HC RX 250 WO HCPCS SELF ADMINISTERED DRUGS (ALT 637 FOR MEDICARE OP): Performed by: STUDENT IN AN ORGANIZED HEALTH CARE EDUCATION/TRAINING PROGRAM

## 2024-07-03 PROCEDURE — 97530 THERAPEUTIC ACTIVITIES: CPT | Mod: GO,CO

## 2024-07-03 PROCEDURE — 99232 SBSQ HOSP IP/OBS MODERATE 35: CPT | Performed by: STUDENT IN AN ORGANIZED HEALTH CARE EDUCATION/TRAINING PROGRAM

## 2024-07-03 PROCEDURE — 85025 COMPLETE CBC W/AUTO DIFF WBC: CPT | Performed by: NURSE PRACTITIONER

## 2024-07-03 PROCEDURE — 36415 COLL VENOUS BLD VENIPUNCTURE: CPT | Performed by: NURSE PRACTITIONER

## 2024-07-03 PROCEDURE — 2500000002 HC RX 250 W HCPCS SELF ADMINISTERED DRUGS (ALT 637 FOR MEDICARE OP, ALT 636 FOR OP/ED): Performed by: STUDENT IN AN ORGANIZED HEALTH CARE EDUCATION/TRAINING PROGRAM

## 2024-07-03 PROCEDURE — 82374 ASSAY BLOOD CARBON DIOXIDE: CPT | Performed by: NURSE PRACTITIONER

## 2024-07-03 PROCEDURE — 1200000002 HC GENERAL ROOM WITH TELEMETRY DAILY

## 2024-07-03 ASSESSMENT — COGNITIVE AND FUNCTIONAL STATUS - GENERAL
TURNING FROM BACK TO SIDE WHILE IN FLAT BAD: A LOT
MOVING TO AND FROM BED TO CHAIR: TOTAL
TOILETING: TOTAL
DRESSING REGULAR UPPER BODY CLOTHING: TOTAL
PERSONAL GROOMING: TOTAL
WALKING IN HOSPITAL ROOM: TOTAL
MOVING FROM LYING ON BACK TO SITTING ON SIDE OF FLAT BED WITH BEDRAILS: A LOT
PERSONAL GROOMING: A LOT
HELP NEEDED FOR BATHING: TOTAL
DRESSING REGULAR LOWER BODY CLOTHING: TOTAL
MOBILITY SCORE: 8
CLIMB 3 TO 5 STEPS WITH RAILING: TOTAL
DAILY ACTIVITIY SCORE: 8
MOVING FROM LYING ON BACK TO SITTING ON SIDE OF FLAT BED WITH BEDRAILS: A LOT
EATING MEALS: A LOT
WALKING IN HOSPITAL ROOM: TOTAL
PERSONAL GROOMING: A LOT
DRESSING REGULAR LOWER BODY CLOTHING: TOTAL
EATING MEALS: A LOT
STANDING UP FROM CHAIR USING ARMS: TOTAL
MOVING TO AND FROM BED TO CHAIR: TOTAL
STANDING UP FROM CHAIR USING ARMS: TOTAL
DRESSING REGULAR UPPER BODY CLOTHING: A LOT
EATING MEALS: A LOT
HELP NEEDED FOR BATHING: TOTAL
TOILETING: TOTAL
DRESSING REGULAR LOWER BODY CLOTHING: A LOT
DAILY ACTIVITIY SCORE: 8
CLIMB 3 TO 5 STEPS WITH RAILING: TOTAL
TOILETING: TOTAL
DAILY ACTIVITIY SCORE: 10
HELP NEEDED FOR BATHING: A LOT
TURNING FROM BACK TO SIDE WHILE IN FLAT BAD: A LOT
DRESSING REGULAR UPPER BODY CLOTHING: TOTAL
MOBILITY SCORE: 8

## 2024-07-03 ASSESSMENT — PAIN SCALES - GENERAL
PAINLEVEL_OUTOF10: 0 - NO PAIN

## 2024-07-03 ASSESSMENT — PAIN - FUNCTIONAL ASSESSMENT: PAIN_FUNCTIONAL_ASSESSMENT: 0-10

## 2024-07-03 NOTE — PROGRESS NOTES
Medical Group Progress Note  ASSESSMENT & PLAN:     Metabolic encephalopathy  Acute complicated UTI  Sepsis in setting of above with endorgan dysfunction with altered mental status  - Sepsis criteria: Temperature, respiratory rate, endorgan dysfunction with change in mental status on admission  - Source seems to be UTI  - Patient at baseline oriented x 2 however my exam was only x 1 at max  - Continue Zosyn  - Afebrile since 7/2 morning  - Follow-up urine culture    Elevated troponin  - Likely demand ischemia from above  - Troponin is slowly going up therefore we will continue to trend  - Trend till peak and potential consult to consult cardiology afterwards    Hypomagnesemia  - Replace as indicated    VTE prophylaxis: Enoxaparin subcutaneous    Disposition/Daily update: Urine cultures remain pending.  Mentation slightly improved, afebrile over the last 24 hours now.  Will continue Zosyn.  Mentation continues to improve and we have urine cultures, potential discharge back to his long-term care facility in 24 to 48 hours.  Will attempt to speak with patient's family.      ---Of note, this documentation is completed using the Dragon Dictation system (voice recognition software). There may be spelling and/or grammatical errors that were not corrected prior to final submission.---    Naga Fleming MD    SUBJECTIVE     Patient was seen and examined bedside this morning.  Had no fevers overnight.  Slightly more oriented on exam today as compared to yesterday.  Had complaints of neck pain.  Did have questions on why he is in the hospital however was not able to comprehend exact details.    OBJECTIVE:     Last Recorded Vitals:  Vitals:    07/02/24 1429 07/02/24 1945 07/02/24 2352 07/03/24 0711   BP: 107/59 126/59 119/68 119/71   BP Location: Left arm   Right arm   Patient Position: Lying   Lying   Pulse: 77 74 94 77   Resp:  16 16 18   Temp: 36.9 °C (98.4 °F) 37.2 °C (99 °F) 37.3 °C (99.1 °F) 37 °C (98.6 °F)    TempSrc: Temporal   Temporal   SpO2: 92% 92% 92% 94%   Weight:       Height:         Last I/O:  I/O last 3 completed shifts:  In: 3310 (40.2 mL/kg) [P.O.:240; I.V.:100 (1.2 mL/kg); IV Piggyback:2970]  Out: 1300 (15.8 mL/kg) [Urine:1300 (0.4 mL/kg/hr)]  Weight: 82.4 kg     Physical Exam  Vitals reviewed.   Constitutional:       Appearance: He is not ill-appearing.   HENT:      Head: Normocephalic and atraumatic.      Nose: Nose normal.   Eyes:      Extraocular Movements: Extraocular movements intact.      Conjunctiva/sclera: Conjunctivae normal.   Cardiovascular:      Rate and Rhythm: Normal rate and regular rhythm.      Pulses: Normal pulses.      Heart sounds: Normal heart sounds.   Pulmonary:      Effort: Pulmonary effort is normal.      Breath sounds: Normal breath sounds.   Abdominal:      General: Bowel sounds are normal.      Palpations: Abdomen is soft.      Tenderness: There is no abdominal tenderness. There is no guarding.   Musculoskeletal:         General: Normal range of motion.      Cervical back: Normal range of motion and neck supple.   Neurological:      Mental Status: He is alert. He is disoriented.      Comments: Alert and oriented to himself only (could not tell me month/year or hospital/city)     Inpatient Medications:  aspirin, 81 mg, oral, Daily  brexpiprazole, 0.5 mg, oral, Daily  enoxaparin, 40 mg, subcutaneous, q24h  finasteride, 5 mg, oral, Nightly  fludrocortisone, 0.2 mg, oral, Daily  folic acid, 1 mg, oral, Daily  lactulose, 10 g, oral, Daily  lamoTRIgine, 400 mg, oral, Nightly  lamoTRIgine, 400 mg, oral, Daily  OLANZapine, 17.5 mg, oral, Nightly  pantoprazole, 40 mg, oral, Daily  phenytoin ER, 200 mg, oral, BID  piperacillin-tazobactam, 3.375 g, intravenous, q8h   And  sodium chloride, 10 mL, intravenous, q8h  sertraline, 100 mg, oral, Daily  thiamine, 100 mg, oral, Daily    PRN Medications  PRN medications: acetaminophen **OR** acetaminophen **OR** acetaminophen, benzocaine-menthol,  melatonin, ondansetron **OR** [DISCONTINUED] ondansetron  Continuous Medications:     LABS AND IMAGING:     Labs:  Results from last 7 days   Lab Units 07/03/24 0322 07/02/24 0710 07/01/24 1912   WBC AUTO x10*3/uL 4.4 5.7 8.2   RBC AUTO x10*6/uL 3.94* 3.88* 4.11*   HEMOGLOBIN g/dL 12.3* 12.2* 12.9*   HEMATOCRIT % 36.5* 36.5* 40.0*   MCV fL 93 94 97   MCH pg 31.2 31.4 31.4   MCHC g/dL 33.7 33.4 32.3   RDW % 13.2 13.4 13.4   PLATELETS AUTO x10*3/uL 147* 150 159     Results from last 7 days   Lab Units 07/03/24 0321 07/02/24 0710 07/01/24 1912   SODIUM mmol/L 134* 135* 139   POTASSIUM mmol/L 3.4* 3.5 3.9   CHLORIDE mmol/L 104 104 105   CO2 mmol/L 24 24 27   BUN mg/dL 27* 27* 28*   CREATININE mg/dL 1.01 1.03 1.21   GLUCOSE mg/dL 114* 110* 128*   PROTEIN TOTAL g/dL 5.7* 5.9* 6.9   CALCIUM mg/dL 7.9* 8.1* 9.1   BILIRUBIN TOTAL mg/dL 0.5 0.6 0.6   ALK PHOS U/L 77 81 101   AST U/L 186* 213* 142*   ALT U/L 64* 53* 31     Results from last 7 days   Lab Units 07/03/24 0321 07/02/24 0710   MAGNESIUM mg/dL 2.12 1.51*     Results from last 7 days   Lab Units 07/02/24  0710 07/01/24  2150 07/01/24  1912   TROPHS ng/L 432* 394* 419*     Imaging:  ECG 12 Lead  Normal sinus rhythm with sinus arrhythmia  Nonspecific ST and T wave abnormality  Abnormal ECG  When compared with ECG of 02-JUL-2024 16:08, (unconfirmed)  Premature atrial complexes are no longer Present  ID interval has decreased  ECG 12 lead  Sinus rhythm with Premature supraventricular complexes  Low voltage QRS  Nonspecific T wave abnormality  Abnormal ECG  When compared with ECG of 08-SEP-2023 22:00,  Previous ECG has undetermined rhythm, needs review  Questionable change in QRS axis  Nonspecific T wave abnormality, worse in Lateral leads  See ED provider note for full interpretation and clinical correlation  ECG 12 lead  Sinus rhythm with marked sinus arrhythmia  Low voltage QRS  Nonspecific ST and T wave abnormality  Abnormal ECG  When compared with ECG of  01-JUL-2024 19:01, (unconfirmed)  Premature supraventricular complexes are no longer Present  Nonspecific T wave abnormality now evident in Anterior leads  ECG 12 lead  Sinus rhythm with Premature supraventricular complexes  Low voltage QRS  Nonspecific ST and T wave abnormality  Abnormal ECG  When compared with ECG of 02-JUL-2024 02:37, (unconfirmed)  No significant change was found  ECG 12 lead  Sinus rhythm with Premature supraventricular complexes  Low voltage QRS  Borderline ECG  When compared with ECG of 01-JUL-2024 22:36, (unconfirmed)  Premature supraventricular complexes are now Present  Nonspecific T wave abnormality, improved in Anterolateral leads

## 2024-07-03 NOTE — PROGRESS NOTES
Occupational Therapy    OT Treatment    Patient Name: Willian Vela  MRN: [id number]  Today's Date: 7/3/2024  Time Calculation  Start Time: 1016  Stop Time: 1039  Time Calculation (min): 23 min      Assessment:  End of Session Communication: Bedside nurse, PCT/NA/CTA (1:1 sitter)  End of Session Patient Position: Bed, 3 rail up, Alarm off, caregiver present (1:1 sitter)     Plan:  Treatment Interventions: ADL retraining, Functional transfer training, Endurance training  OT Frequency: 2 times per week  Treatment Interventions: ADL retraining, Functional transfer training, Endurance training    Subjective   Previous Visit Info:  OT Last Visit  OT Received On: 07/03/24  General:  General  Reason for Referral: sepsis, toxic metabolic encephalopahty, ОЛЕГ, UTI, hematuria.  Past Medical History Relevant to Rehab: L clavical fracture 5/31/24  Prior to Session Communication: Bedside nurse  Patient Position Received: Bed, 3 rail up, Alarm off, not on at start of session (1:1 sitter)  Precautions:  UE Weight Bearing Status: Left Non-Weight Bearing  Medical Precautions: Fall precautions     Pain:  Pain Assessment  0-10 (Numeric) Pain Score: 0 - No pain    Objective    Cognition:  Cognition  Overall Cognitive Status: Impaired  Orientation Level: Disoriented to place, Disoriented to time, Disoriented to situation  Following Commands: Follows one step commands with repetition (less then 10%)  Safety Judgment: Decreased awareness of need for safety precautions  Problem Solving: Assistance required to implement solutions  Insight: Severe  Impulsive: Mildly     Bed Mobility/Transfers: Bed Mobility 1  Bed Mobility 1: Supine to sitting  Level of Assistance 1: Maximum assistance, Maximum verbal cues, Maximum tactile cues  Bed Mobility 2  Bed Mobility  2: Sitting to supine  Level of Assistance 2: Dependent, +2, Maximum tactile cues, Maximum verbal cues  Bed Mobility 3  Bed Mobility 3: Rolling right, Rolling left  Level of Assistance 3:  Maximum assistance, +2, Maximum tactile cues, Maximum verbal cues  Bed Mobility 4  Bed Mobility 4: Scooting (sitting EOB)  Level of Assistance 4: Dependent, +2  Bed Mobility Comments 4: with use of RUE    Sitting Balance:  Dynamic Sitting Balance  Dynamic Sitting-Balance:  (fair-/poor+)      Therapeutic Activity  Therapeutic Activity Performed: Yes  Therapeutic Activity 1: patient sat EOB for a total of 7 mins this date with fair-/poor+ balance with 1-0 ue support.  demo's posterior lean throughout    Balance/Neuromuscular Re-Education  Balance/Neuromuscular Re-Education Activity Performed: Yes  Balance/Neuromuscular Re-Education Activity 1: reaching outside XAVIER with RUE and poor+ balance with 1-0 ue support    Outcome Measures:[facility] Daily Activity  Putting on and taking off regular lower body clothing: Total  Bathing (including washing, rinsing, drying): Total  Putting on and taking off regular upper body clothing: Total  Toileting, which includes using toilet, bedpan or urinal: Total  Taking care of personal grooming such as brushing teeth: A lot  Eating Meals: A lot  Daily Activity - Total Score: 8    Education Documentation  ADL Training, taught by MANJU Mercado at 7/3/2024  2:28 PM.  Learner: Patient  Readiness: Acceptance  Method: Explanation  Response: Needs Reinforcement    OP EDUCATION:  Education  Individual(s) Educated: Patient  Education Provided: Fall precautons, Risk and benefits of OT discussed with patient or other, POC discussed and agreed upon  Diagnosis and Precautions: NWB LUE  Patient/Caregiver Demonstrated Understanding: yes  Plan of Care Discussed and Agreed Upon: yes  Patient Response to Education: Patient/Caregiver Verbalized Understanding of Information    Goals:  Encounter Problems       Encounter Problems (Active)       OT Goals       Min A for all functional transfers  (Progressing)       Start:  07/02/24    Expected End:  07/16/24            Pt. will be able to follow one step  commands during ADL participation.  (Progressing)       Start:  07/02/24    Expected End:  07/16/24            Fair dyn sitting balance EOB for ADLs/functional activities  (Progressing)       Start:  07/02/24    Expected End:  07/16/24

## 2024-07-04 VITALS
HEART RATE: 71 BPM | SYSTOLIC BLOOD PRESSURE: 139 MMHG | BODY MASS INDEX: 24.61 KG/M2 | OXYGEN SATURATION: 93 % | DIASTOLIC BLOOD PRESSURE: 66 MMHG | TEMPERATURE: 97.5 F | RESPIRATION RATE: 20 BRPM | HEIGHT: 72 IN | WEIGHT: 181.66 LBS

## 2024-07-04 LAB
ALBUMIN SERPL BCP-MCNC: 2.8 G/DL (ref 3.4–5)
ALP SERPL-CCNC: 68 U/L (ref 33–136)
ALT SERPL W P-5'-P-CCNC: 52 U/L (ref 10–52)
ANION GAP SERPL CALC-SCNC: 9 MMOL/L (ref 10–20)
AST SERPL W P-5'-P-CCNC: 123 U/L (ref 9–39)
BASOPHILS # BLD AUTO: 0.01 X10*3/UL (ref 0–0.1)
BASOPHILS NFR BLD AUTO: 0.3 %
BILIRUB SERPL-MCNC: 0.4 MG/DL (ref 0–1.2)
BUN SERPL-MCNC: 23 MG/DL (ref 6–23)
CALCIUM SERPL-MCNC: 8 MG/DL (ref 8.6–10.3)
CHLORIDE SERPL-SCNC: 104 MMOL/L (ref 98–107)
CO2 SERPL-SCNC: 27 MMOL/L (ref 21–32)
CREAT SERPL-MCNC: 0.99 MG/DL (ref 0.5–1.3)
EGFRCR SERPLBLD CKD-EPI 2021: 75 ML/MIN/1.73M*2
EOSINOPHIL # BLD AUTO: 0.1 X10*3/UL (ref 0–0.4)
EOSINOPHIL NFR BLD AUTO: 2.9 %
ERYTHROCYTE [DISTWIDTH] IN BLOOD BY AUTOMATED COUNT: 13.3 % (ref 11.5–14.5)
GLUCOSE SERPL-MCNC: 89 MG/DL (ref 74–99)
HCT VFR BLD AUTO: 34.2 % (ref 41–52)
HGB BLD-MCNC: 11.4 G/DL (ref 13.5–17.5)
IMM GRANULOCYTES # BLD AUTO: 0.01 X10*3/UL (ref 0–0.5)
IMM GRANULOCYTES NFR BLD AUTO: 0.3 % (ref 0–0.9)
LYMPHOCYTES # BLD AUTO: 0.82 X10*3/UL (ref 0.8–3)
LYMPHOCYTES NFR BLD AUTO: 23.7 %
MAGNESIUM SERPL-MCNC: 1.73 MG/DL (ref 1.6–2.4)
MCH RBC QN AUTO: 31.2 PG (ref 26–34)
MCHC RBC AUTO-ENTMCNC: 33.3 G/DL (ref 32–36)
MCV RBC AUTO: 94 FL (ref 80–100)
MONOCYTES # BLD AUTO: 0.32 X10*3/UL (ref 0.05–0.8)
MONOCYTES NFR BLD AUTO: 9.2 %
NEUTROPHILS # BLD AUTO: 2.2 X10*3/UL (ref 1.6–5.5)
NEUTROPHILS NFR BLD AUTO: 63.6 %
NRBC BLD-RTO: 0 /100 WBCS (ref 0–0)
PLATELET # BLD AUTO: 148 X10*3/UL (ref 150–450)
POTASSIUM SERPL-SCNC: 3.3 MMOL/L (ref 3.5–5.3)
PROT SERPL-MCNC: 5.4 G/DL (ref 6.4–8.2)
RBC # BLD AUTO: 3.65 X10*6/UL (ref 4.5–5.9)
SODIUM SERPL-SCNC: 137 MMOL/L (ref 136–145)
WBC # BLD AUTO: 3.5 X10*3/UL (ref 4.4–11.3)

## 2024-07-04 PROCEDURE — 85025 COMPLETE CBC W/AUTO DIFF WBC: CPT | Performed by: STUDENT IN AN ORGANIZED HEALTH CARE EDUCATION/TRAINING PROGRAM

## 2024-07-04 PROCEDURE — 2500000004 HC RX 250 GENERAL PHARMACY W/ HCPCS (ALT 636 FOR OP/ED): Performed by: STUDENT IN AN ORGANIZED HEALTH CARE EDUCATION/TRAINING PROGRAM

## 2024-07-04 PROCEDURE — 2500000001 HC RX 250 WO HCPCS SELF ADMINISTERED DRUGS (ALT 637 FOR MEDICARE OP): Performed by: NURSE PRACTITIONER

## 2024-07-04 PROCEDURE — 83735 ASSAY OF MAGNESIUM: CPT | Performed by: STUDENT IN AN ORGANIZED HEALTH CARE EDUCATION/TRAINING PROGRAM

## 2024-07-04 PROCEDURE — 99239 HOSP IP/OBS DSCHRG MGMT >30: CPT | Performed by: STUDENT IN AN ORGANIZED HEALTH CARE EDUCATION/TRAINING PROGRAM

## 2024-07-04 PROCEDURE — 2500000002 HC RX 250 W HCPCS SELF ADMINISTERED DRUGS (ALT 637 FOR MEDICARE OP, ALT 636 FOR OP/ED): Performed by: STUDENT IN AN ORGANIZED HEALTH CARE EDUCATION/TRAINING PROGRAM

## 2024-07-04 PROCEDURE — 80053 COMPREHEN METABOLIC PANEL: CPT | Performed by: STUDENT IN AN ORGANIZED HEALTH CARE EDUCATION/TRAINING PROGRAM

## 2024-07-04 PROCEDURE — 2500000001 HC RX 250 WO HCPCS SELF ADMINISTERED DRUGS (ALT 637 FOR MEDICARE OP): Performed by: STUDENT IN AN ORGANIZED HEALTH CARE EDUCATION/TRAINING PROGRAM

## 2024-07-04 PROCEDURE — 36415 COLL VENOUS BLD VENIPUNCTURE: CPT | Performed by: STUDENT IN AN ORGANIZED HEALTH CARE EDUCATION/TRAINING PROGRAM

## 2024-07-04 RX ORDER — LEVOFLOXACIN 750 MG/1
750 TABLET ORAL DAILY
Start: 2024-07-04 | End: 2024-07-07

## 2024-07-04 RX ORDER — POTASSIUM CHLORIDE 20 MEQ/1
40 TABLET, EXTENDED RELEASE ORAL ONCE
Status: COMPLETED | OUTPATIENT
Start: 2024-07-04 | End: 2024-07-04

## 2024-07-04 RX ORDER — MAGNESIUM SULFATE HEPTAHYDRATE 40 MG/ML
2 INJECTION, SOLUTION INTRAVENOUS ONCE
Status: COMPLETED | OUTPATIENT
Start: 2024-07-04 | End: 2024-07-04

## 2024-07-04 NOTE — CARE PLAN
The patient's goals for the shift include  rest    The clinical goals for the shift include patient will have improved mentation.

## 2024-07-04 NOTE — PROGRESS NOTES
07/04/24 1210   Current Planned Discharge Disposition   Current Planned Discharge Disposition Inter     Patient to return back to [facility], left message for Tsering Howard  of discharge plan. 8th floor, [place]  notified of pickup, she will advise nurse. [facility] for a 2 pm requested pickup time.

## 2024-07-04 NOTE — DISCHARGE SUMMARY
Medical Group Discharge Summary  DISCHARGE DIAGNOSIS     Metabolic encephalopathy  Acute complicated UTI  Sepsis in setting of above with endorgan dysfunction of altered mental status and elevated troponin  Elevated troponin likely demand ischemia from type II MI  Hypomagnesemia  Seizure disorder    HOSPITAL COURSE AND DETAILS     This is an 83-year-old male with a significant past medical history of seizure disorder, previous stroke, vascular dementia, BPH that presented from his long-term care facility with altered mental status and fevers.  On admission patient was found to have sepsis in setting of acute urinary tract infection along with endorgan damage of metabolic encephalopathy and elevated troponin.    Was treated with IV antibiotics during hospitalization.  Urine cultures remain preliminary, no growth with bacteria was seen prior to discharge.  Was transition to oral antibiotic regimen.  Urine cultures will be followed after patient is discharged.  Mentation improved, was what seemed like back to his baseline.  Elevated troponin was deemed to be in setting of the sepsis as per above with demand ischemia.    Patient is being discharged back to his long-term care facility with short course of oral antibiotics.  Will follow-up with facility staff physician as indicated.  Patient did have a one-to-one bedside sitter for protection of his IV when he was initially confused.  Patient did not have any behavioral issues.    Total time spent on discharge: >30min      ---Of note, this documentation is completed using the Dragon Dictation system (voice recognition software). There may be spelling and/or grammatical errors that were not corrected prior to final submission.---    Naga Fleming MD    DISCHARGE PHYSICAL EXAM     Last Recorded Vitals:  Vitals:    07/04/24 0006 07/04/24 0403 07/04/24 0710 07/04/24 1123   BP: 103/57 102/56 92/58 139/66   BP Location: Left arm Left arm Left arm Left arm   Patient  Position: Lying Lying Lying Sitting   Pulse: 55 59 63 71   Resp: 22 22 22 20   Temp: 36.2 °C (97.2 °F) 36.4 °C (97.5 °F) 36.6 °C (97.9 °F) 36.4 °C (97.5 °F)   TempSrc: Temporal Temporal Temporal Temporal   SpO2: 94% 97% 95% 93%   Weight:       Height:         Physical Exam  Vitals reviewed.   Constitutional:       Appearance: He is not ill-appearing.   HENT:      Head: Normocephalic and atraumatic.      Nose: Nose normal.   Eyes:      Extraocular Movements: Extraocular movements intact.      Conjunctiva/sclera: Conjunctivae normal.   Cardiovascular:      Rate and Rhythm: Normal rate and regular rhythm.      Pulses: Normal pulses.      Heart sounds: Normal heart sounds.   Pulmonary:      Effort: Pulmonary effort is normal.      Breath sounds: Normal breath sounds.   Abdominal:      General: Bowel sounds are normal.      Palpations: Abdomen is soft.      Tenderness: There is no abdominal tenderness. There is no guarding.   Musculoskeletal:         General: Normal range of motion.      Cervical back: Normal range of motion and neck supple.   Neurological:      Mental Status: He is alert. Mental status is at baseline. He is disoriented.      Comments: Alert and oriented to himself only (could not tell me month/year or hospital/city)       DISCHARGE MEDICATIONS        Your medication list        START taking these medications        Instructions Last Dose Given Next Dose Due   levoFLOXacin 750 mg tablet  Commonly known as: Levaquin      Take 1 tablet (750 mg) by mouth once daily for 3 days.              CONTINUE taking these medications        Instructions Last Dose Given Next Dose Due   acetaminophen 325 mg tablet  Commonly known as: Tylenol           aspirin 81 mg EC tablet           cholecalciferol 50 MCG (2000 UT) tablet  Commonly known as: Vitamin D-3           [facility] ORAL           famotidine 20 mg tablet  Commonly known as: Pepcid           finasteride 5 mg tablet  Commonly known as:  Proscar           fludrocortisone 0.1 mg tablet  Commonly known as: Florinef           folic acid 1 mg tablet  Commonly known as: Folvite           lactulose 20 gram/30 mL oral solution           lamoTRIgine 200 mg tablet  Commonly known as: LaMICtal           lamoTRIgine 200 mg tablet  Commonly known as: LaMICtal           loperamide 2 mg tablet  Commonly known as: Imodium A-D           meclizine 25 mg tablet  Commonly known as: Antivert           melatonin 10 mg tablet           nystatin 100,000 unit/gram powder  Commonly known as: Mycostatin           OLANZapine 5 mg tablet  Commonly known as: ZyPREXA           ondansetron 4 mg tablet  Commonly known as: Zofran           oxybutynin XL 5 mg 24 hr tablet  Commonly known as: Ditropan-XL           phenytoin  mg capsule  Commonly known as: Dilantin           polyethylene glycol 17 gram packet  Commonly known as: Glycolax, Miralax           Rexulti 0.5 mg tablet  Generic drug: brexpiprazole           sennosides-docusate sodium 8.6-50 mg tablet  Commonly known as: Latasha-Colace           sertraline 100 mg tablet  Commonly known as: Zoloft           thiamine 100 mg tablet  Commonly known as: Vitamin B-1                     Where to Get Your Medications        Information about where to get these medications is not yet available    Ask your nurse or doctor about these medications  levoFLOXacin 750 mg tablet           OUTPATIENT FOLLOW-UP     Future Appointments   Date Time Provider Department Center   7/10/2024 10:00 AM Cole C Budinsky, MD [address]

## 2024-07-06 LAB
BACTERIA BLD CULT: NORMAL
BACTERIA BLD CULT: NORMAL

## 2024-07-07 LAB — BACTERIA UR CULT: NORMAL

## 2024-07-10 ENCOUNTER — HOSPITAL ENCOUNTER (OUTPATIENT)
Dept: RADIOLOGY | Facility: CLINIC | Age: 84
Discharge: HOME | End: 2024-07-10
Payer: COMMERCIAL

## 2024-07-10 ENCOUNTER — OFFICE VISIT (OUTPATIENT)
Dept: ORTHOPEDIC SURGERY | Facility: CLINIC | Age: 84
End: 2024-07-10
Payer: COMMERCIAL

## 2024-07-10 DIAGNOSIS — S42.032A TRAUMATIC CLOSED FRACTURE OF DISTAL CLAVICLE WITH MINIMAL DISPLACEMENT, LEFT, INITIAL ENCOUNTER: ICD-10-CM

## 2024-07-10 LAB
ATRIAL RATE: 92 BPM
P AXIS: 68 DEGREES
P OFFSET: 189 MS
P ONSET: 127 MS
PR INTERVAL: 190 MS
Q ONSET: 222 MS
QRS COUNT: 15 BEATS
QRS DURATION: 110 MS
QT INTERVAL: 336 MS
QTC CALCULATION(BAZETT): 415 MS
QTC FREDERICIA: 387 MS
R AXIS: 42 DEGREES
T AXIS: 76 DEGREES
T OFFSET: 390 MS
VENTRICULAR RATE: 92 BPM

## 2024-07-10 PROCEDURE — 73000 X-RAY EXAM OF COLLAR BONE: CPT | Mod: LT

## 2024-07-10 PROCEDURE — 1159F MED LIST DOCD IN RCRD: CPT | Performed by: FAMILY MEDICINE

## 2024-07-10 PROCEDURE — 73000 X-RAY EXAM OF COLLAR BONE: CPT | Mod: LEFT SIDE | Performed by: FAMILY MEDICINE

## 2024-07-10 PROCEDURE — 1160F RVW MEDS BY RX/DR IN RCRD: CPT | Performed by: FAMILY MEDICINE

## 2024-07-10 PROCEDURE — 1157F ADVNC CARE PLAN IN RCRD: CPT | Performed by: FAMILY MEDICINE

## 2024-07-10 PROCEDURE — 1111F DSCHRG MED/CURRENT MED MERGE: CPT | Performed by: FAMILY MEDICINE

## 2024-07-10 PROCEDURE — 99213 OFFICE O/P EST LOW 20 MIN: CPT | Performed by: FAMILY MEDICINE

## 2024-07-10 NOTE — PROGRESS NOTES
Established Patient Follow-Up Visit    CC:   Chief Complaint   Patient presents with    Left Clavicle - Pain, Follow-up     Traumatic closed fracture of distal clavicle with minimal displacement, left, initial encounter      Repeat Xrays today        HPI:  Willian is a 84 y.o. male returns here today for follow-up visit regarding: Left distal clavicle fracture.  He states that he was wearing the sling for a very short period of time he denies any numbness tingling or burning denies any pain or discomfort states he is even forgotten that his shoulder was injured.  He presents here today for follow-up evaluation.          REVIEW OF SYSTEMS:  GENERAL: Negative for malaise, significant weight loss, fever  MUSCULOSKELETAL: See HPI  NEURO: Negative for numbness / tingling       PHYSICAL EXAM:  -Neuro: Gross sensation intact to the upper extremities bilaterally.  -Extremity: Left upper extremity demonstrate skin which is warm pink well-perfused the clavicle has no tenderness palpation he has excellent full range of motion with forward flexion and lateral abduction negative crossarm test here today.  No open cuts wounds or sores over the area of the skin of the anterior lateral aspect of the clavicle shoulder.   strength equal symmetric and intact    IMAGING: Repeat x-rays today demonstrate stable appearing minimally displaced left distal clavicle fracture with subtle sclerotic callus formation.      PROCEDURE: None  Procedures     ASSESSMENT:   Follow-up visit for:  Problem List Items Addressed This Visit    None  Visit Diagnoses       Traumatic closed fracture of distal clavicle with minimal displacement, left, initial encounter        Relevant Orders    XR clavicle left             PLAN: At this time we will offer the patient to follow-up as needed since he is feeling significantly well.  I do not anticipate that he will continue with the sling and at this time since is not having pain with excellent range of motion  and excellent strength the sling would be no longer necessary or indicated.  Will allow the patient to continue with increased physical therapy going forward.  He should call or return with any issues down the road.  Recommend he continue with his daily vitamin D supplementation and intermittent use of Tylenol and icing to assist with pain control if necessary.  No need for scheduled follow-up visit, patient should inform his staff or facility if there is worsening issues or concerns and we will certainly see him back as soon as we can.  Orders Placed This Encounter    XR clavicle left           At the conclusion of the visit there were no further questions by the patient/family regarding their plan of care.  Patient was instructed to call or return with any issues, questions, or concerns regarding their injury and/or treatment plan described above.     07/10/24 at 10:30 AM - Cole C Budinsky, MD    Office: [phone]    This note was prepared using voice recognition software.  The details of this note are correct and have been reviewed, and corrected to the best of my ability.  Some grammatical errors may persist related to the Dragon software.

## 2024-07-11 LAB
ATRIAL RATE: 83 BPM
ATRIAL RATE: 85 BPM
ATRIAL RATE: 95 BPM
ATRIAL RATE: 97 BPM
P AXIS: -15 DEGREES
P AXIS: 108 DEGREES
P AXIS: 29 DEGREES
P AXIS: 66 DEGREES
P OFFSET: 156 MS
P OFFSET: 177 MS
P OFFSET: 185 MS
P OFFSET: 202 MS
P ONSET: 111 MS
P ONSET: 123 MS
P ONSET: 126 MS
P ONSET: 147 MS
PR INTERVAL: 150 MS
PR INTERVAL: 188 MS
PR INTERVAL: 192 MS
PR INTERVAL: 204 MS
Q ONSET: 213 MS
Q ONSET: 217 MS
Q ONSET: 222 MS
Q ONSET: 222 MS
QRS COUNT: 14 BEATS
QRS COUNT: 14 BEATS
QRS COUNT: 15 BEATS
QRS COUNT: 16 BEATS
QRS DURATION: 100 MS
QRS DURATION: 102 MS
QRS DURATION: 104 MS
QRS DURATION: 94 MS
QT INTERVAL: 344 MS
QT INTERVAL: 346 MS
QT INTERVAL: 350 MS
QT INTERVAL: 352 MS
QTC CALCULATION(BAZETT): 413 MS
QTC CALCULATION(BAZETT): 416 MS
QTC CALCULATION(BAZETT): 434 MS
QTC CALCULATION(BAZETT): 436 MS
QTC FREDERICIA: 392 MS
QTC FREDERICIA: 393 MS
QTC FREDERICIA: 403 MS
QTC FREDERICIA: 403 MS
R AXIS: 12 DEGREES
R AXIS: 14 DEGREES
R AXIS: 17 DEGREES
R AXIS: 18 DEGREES
T AXIS: 51 DEGREES
T AXIS: 76 DEGREES
T AXIS: 81 DEGREES
T AXIS: 93 DEGREES
T OFFSET: 388 MS
T OFFSET: 389 MS
T OFFSET: 395 MS
T OFFSET: 398 MS
VENTRICULAR RATE: 83 BPM
VENTRICULAR RATE: 85 BPM
VENTRICULAR RATE: 95 BPM
VENTRICULAR RATE: 97 BPM

## 2024-07-15 NOTE — PROCEDURES
Critical Care    Performed by: Juan Francisco East MD  Authorized by: Juan Francisco East MD    Critical care provider statement:     Critical care time (minutes):  50    Critical care start time:  7/1/2024 7:00 PM    Critical care end time:  7/1/2024 7:50 PM    Critical care was necessary to treat or prevent imminent or life-threatening deterioration of the following conditions:  Sepsis    Critical care was time spent personally by me on the following activities:  Development of treatment plan with patient or surrogate, evaluation of patient's response to treatment, examination of patient, obtaining history from patient or surrogate, review of old charts, re-evaluation of patient's condition and ordering and performing treatments and interventions    I assumed direction of critical care for this patient from another provider in my specialty: no      Care discussed with: admitting provider

## 2024-12-24 ENCOUNTER — APPOINTMENT (OUTPATIENT)
Dept: RADIOLOGY | Facility: HOSPITAL | Age: 84
End: 2024-12-24
Payer: COMMERCIAL

## 2024-12-24 ENCOUNTER — HOSPITAL ENCOUNTER (INPATIENT)
Facility: HOSPITAL | Age: 84
LOS: 3 days | Discharge: NURSING, REHAB OR LONG-TERM CARE | End: 2024-12-27
Attending: INTERNAL MEDICINE | Admitting: INTERNAL MEDICINE
Payer: COMMERCIAL
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
## 2024-12-24 DIAGNOSIS — S72.111A CLOSED DISPLACED FRACTURE OF GREATER TROCHANTER OF RIGHT FEMUR, INITIAL ENCOUNTER: ICD-10-CM

## 2024-12-24 DIAGNOSIS — N17.9 ACUTE RENAL FAILURE, UNSPECIFIED ACUTE RENAL FAILURE TYPE (CMS-HCC): ICD-10-CM

## 2024-12-24 DIAGNOSIS — S70.01XA CONTUSION OF RIGHT HIP, INITIAL ENCOUNTER: ICD-10-CM

## 2024-12-24 LAB
ALBUMIN SERPL BCP-MCNC: 3.9 G/DL (ref 3.4–5)
ALP SERPL-CCNC: 139 U/L (ref 33–136)
ALT SERPL W P-5'-P-CCNC: 7 U/L (ref 10–52)
ANION GAP SERPL CALC-SCNC: 10 MMOL/L (ref 10–20)
AST SERPL W P-5'-P-CCNC: 14 U/L (ref 9–39)
BASOPHILS # BLD AUTO: 0.02 X10*3/UL (ref 0–0.1)
BASOPHILS NFR BLD AUTO: 0.3 %
BILIRUB SERPL-MCNC: 0.3 MG/DL (ref 0–1.2)
BUN SERPL-MCNC: 35 MG/DL (ref 6–23)
CALCIUM SERPL-MCNC: 9.2 MG/DL (ref 8.6–10.3)
CHLORIDE SERPL-SCNC: 109 MMOL/L (ref 98–107)
CO2 SERPL-SCNC: 27 MMOL/L (ref 21–32)
CREAT SERPL-MCNC: 1.54 MG/DL (ref 0.5–1.3)
EGFRCR SERPLBLD CKD-EPI 2021: 44 ML/MIN/1.73M*2
EOSINOPHIL # BLD AUTO: 0.1 X10*3/UL (ref 0–0.4)
EOSINOPHIL NFR BLD AUTO: 1.7 %
ERYTHROCYTE [DISTWIDTH] IN BLOOD BY AUTOMATED COUNT: 13.7 % (ref 11.5–14.5)
GLUCOSE SERPL-MCNC: 92 MG/DL (ref 74–99)
HCT VFR BLD AUTO: 39 % (ref 41–52)
HGB BLD-MCNC: 12.8 G/DL (ref 13.5–17.5)
IMM GRANULOCYTES # BLD AUTO: 0.01 X10*3/UL (ref 0–0.5)
IMM GRANULOCYTES NFR BLD AUTO: 0.2 % (ref 0–0.9)
LYMPHOCYTES # BLD AUTO: 1.62 X10*3/UL (ref 0.8–3)
LYMPHOCYTES NFR BLD AUTO: 27.1 %
MCH RBC QN AUTO: 30.6 PG (ref 26–34)
MCHC RBC AUTO-ENTMCNC: 32.8 G/DL (ref 32–36)
MCV RBC AUTO: 93 FL (ref 80–100)
MONOCYTES # BLD AUTO: 0.48 X10*3/UL (ref 0.05–0.8)
MONOCYTES NFR BLD AUTO: 8 %
NEUTROPHILS # BLD AUTO: 3.75 X10*3/UL (ref 1.6–5.5)
NEUTROPHILS NFR BLD AUTO: 62.7 %
NRBC BLD-RTO: 0 /100 WBCS (ref 0–0)
PLATELET # BLD AUTO: 237 X10*3/UL (ref 150–450)
POTASSIUM SERPL-SCNC: 3.4 MMOL/L (ref 3.5–5.3)
PROT SERPL-MCNC: 6.9 G/DL (ref 6.4–8.2)
RBC # BLD AUTO: 4.18 X10*6/UL (ref 4.5–5.9)
SODIUM SERPL-SCNC: 143 MMOL/L (ref 136–145)
WBC # BLD AUTO: 6 X10*3/UL (ref 4.4–11.3)

## 2024-12-24 PROCEDURE — 71045 X-RAY EXAM CHEST 1 VIEW: CPT | Performed by: RADIOLOGY

## 2024-12-24 PROCEDURE — 70450 CT HEAD/BRAIN W/O DYE: CPT

## 2024-12-24 PROCEDURE — 72192 CT PELVIS W/O DYE: CPT | Performed by: RADIOLOGY

## 2024-12-24 PROCEDURE — 36415 COLL VENOUS BLD VENIPUNCTURE: CPT | Performed by: PHYSICIAN ASSISTANT

## 2024-12-24 PROCEDURE — 2500000004 HC RX 250 GENERAL PHARMACY W/ HCPCS (ALT 636 FOR OP/ED): Performed by: PHYSICIAN ASSISTANT

## 2024-12-24 PROCEDURE — 2500000001 HC RX 250 WO HCPCS SELF ADMINISTERED DRUGS (ALT 637 FOR MEDICARE OP): Performed by: PHYSICIAN ASSISTANT

## 2024-12-24 PROCEDURE — 99223 1ST HOSP IP/OBS HIGH 75: CPT | Performed by: INTERNAL MEDICINE

## 2024-12-24 PROCEDURE — 70450 CT HEAD/BRAIN W/O DYE: CPT | Performed by: RADIOLOGY

## 2024-12-24 PROCEDURE — 99285 EMERGENCY DEPT VISIT HI MDM: CPT | Mod: 25

## 2024-12-24 PROCEDURE — 71045 X-RAY EXAM CHEST 1 VIEW: CPT

## 2024-12-24 PROCEDURE — 2500000004 HC RX 250 GENERAL PHARMACY W/ HCPCS (ALT 636 FOR OP/ED): Performed by: INTERNAL MEDICINE

## 2024-12-24 PROCEDURE — 72170 X-RAY EXAM OF PELVIS: CPT | Performed by: RADIOLOGY

## 2024-12-24 PROCEDURE — 80053 COMPREHEN METABOLIC PANEL: CPT | Performed by: PHYSICIAN ASSISTANT

## 2024-12-24 PROCEDURE — 1200000002 HC GENERAL ROOM WITH TELEMETRY DAILY

## 2024-12-24 PROCEDURE — 72170 X-RAY EXAM OF PELVIS: CPT

## 2024-12-24 PROCEDURE — 85025 COMPLETE CBC W/AUTO DIFF WBC: CPT | Performed by: PHYSICIAN ASSISTANT

## 2024-12-24 PROCEDURE — 72192 CT PELVIS W/O DYE: CPT

## 2024-12-24 RX ORDER — ACETAMINOPHEN 650 MG/1
650 SUPPOSITORY RECTAL EVERY 4 HOURS PRN
Status: DISCONTINUED | OUTPATIENT
Start: 2024-12-24 | End: 2024-12-27 | Stop reason: HOSPADM

## 2024-12-24 RX ORDER — OLANZAPINE 10 MG/2ML
5 INJECTION, POWDER, FOR SOLUTION INTRAMUSCULAR ONCE
Status: COMPLETED | OUTPATIENT
Start: 2024-12-24 | End: 2024-12-24

## 2024-12-24 RX ORDER — ACETAMINOPHEN 160 MG/5ML
650 SOLUTION ORAL EVERY 4 HOURS PRN
Status: DISCONTINUED | OUTPATIENT
Start: 2024-12-24 | End: 2024-12-27 | Stop reason: HOSPADM

## 2024-12-24 RX ORDER — ACETAMINOPHEN 325 MG/1
650 TABLET ORAL EVERY 4 HOURS PRN
Status: DISCONTINUED | OUTPATIENT
Start: 2024-12-24 | End: 2024-12-27 | Stop reason: HOSPADM

## 2024-12-24 RX ORDER — TRAZODONE HYDROCHLORIDE 50 MG/1
100 TABLET ORAL ONCE
Status: COMPLETED | OUTPATIENT
Start: 2024-12-24 | End: 2024-12-24

## 2024-12-24 RX ORDER — LORAZEPAM 0.5 MG/1
0.5 TABLET ORAL EVERY 6 HOURS PRN
COMMUNITY

## 2024-12-24 RX ORDER — ONDANSETRON HYDROCHLORIDE 2 MG/ML
4 INJECTION, SOLUTION INTRAVENOUS EVERY 8 HOURS PRN
Status: DISCONTINUED | OUTPATIENT
Start: 2024-12-24 | End: 2024-12-27 | Stop reason: HOSPADM

## 2024-12-24 RX ORDER — TALC
3 POWDER (GRAM) TOPICAL NIGHTLY PRN
Status: DISCONTINUED | OUTPATIENT
Start: 2024-12-24 | End: 2024-12-27 | Stop reason: HOSPADM

## 2024-12-24 RX ORDER — ONDANSETRON 4 MG/1
4 TABLET, FILM COATED ORAL EVERY 8 HOURS PRN
Status: DISCONTINUED | OUTPATIENT
Start: 2024-12-24 | End: 2024-12-27 | Stop reason: HOSPADM

## 2024-12-24 RX ORDER — POLYETHYLENE GLYCOL 3350 17 G/17G
17 POWDER, FOR SOLUTION ORAL DAILY PRN
Status: DISCONTINUED | OUTPATIENT
Start: 2024-12-24 | End: 2024-12-25

## 2024-12-24 RX ORDER — TRAZODONE HYDROCHLORIDE 100 MG/1
100 TABLET ORAL NIGHTLY
COMMUNITY

## 2024-12-24 RX ADMIN — SODIUM CHLORIDE, POTASSIUM CHLORIDE, SODIUM LACTATE AND CALCIUM CHLORIDE 1000 ML: 600; 310; 30; 20 INJECTION, SOLUTION INTRAVENOUS at 22:44

## 2024-12-24 RX ADMIN — OLANZAPINE 5 MG: 10 INJECTION, POWDER, FOR SOLUTION INTRAMUSCULAR at 22:33

## 2024-12-24 RX ADMIN — TRAZODONE HYDROCHLORIDE 100 MG: 50 TABLET ORAL at 21:48

## 2024-12-24 SDOH — HEALTH STABILITY: MENTAL HEALTH: HAVE YOU ACTUALLY HAD ANY THOUGHTS OF KILLING YOURSELF?: NO

## 2024-12-24 SDOH — HEALTH STABILITY: MENTAL HEALTH: CONTENT: PREOCCUPATION

## 2024-12-24 SDOH — HEALTH STABILITY: MENTAL HEALTH: BEHAVIORS/MOOD: APPROPRIATE FOR SITUATION

## 2024-12-24 SDOH — SOCIAL STABILITY: SOCIAL INSECURITY: HAVE YOU HAD THOUGHTS OF HARMING ANYONE ELSE?: UNABLE TO ASSESS

## 2024-12-24 SDOH — HEALTH STABILITY: MENTAL HEALTH: HALLUCINATION: VISUAL;TACTILE

## 2024-12-24 SDOH — HEALTH STABILITY: MENTAL HEALTH: SLEEP PATTERN: UNABLE TO ASSESS

## 2024-12-24 SDOH — SOCIAL STABILITY: SOCIAL NETWORK: EMOTIONAL SUPPORT GIVEN: REASSURE

## 2024-12-24 SDOH — SOCIAL STABILITY: SOCIAL INSECURITY: HAS ANYONE EVER THREATENED TO HURT YOUR FAMILY OR YOUR PETS?: UNABLE TO ASSESS

## 2024-12-24 SDOH — SOCIAL STABILITY: SOCIAL INSECURITY: ARE THERE ANY APPARENT SIGNS OF INJURIES/BEHAVIORS THAT COULD BE RELATED TO ABUSE/NEGLECT?: UNABLE TO ASSESS

## 2024-12-24 SDOH — HEALTH STABILITY: MENTAL HEALTH: FOR HIGH RISK PATIENTS: 1:1 PATIENT OBSERVER AT ALL TIMES

## 2024-12-24 SDOH — HEALTH STABILITY: MENTAL HEALTH: SUICIDE ASSESSMENT: ADULT (C-SSRS)

## 2024-12-24 SDOH — SOCIAL STABILITY: SOCIAL INSECURITY: WERE YOU ABLE TO COMPLETE ALL THE BEHAVIORAL HEALTH SCREENINGS?: NO

## 2024-12-24 SDOH — SOCIAL STABILITY: SOCIAL INSECURITY: HAVE YOU HAD ANY THOUGHTS OF HARMING ANYONE ELSE?: UNABLE TO ASSESS

## 2024-12-24 SDOH — HEALTH STABILITY: MENTAL HEALTH

## 2024-12-24 SDOH — HEALTH STABILITY: MENTAL HEALTH: NEEDS EXPRESSED: DENIES

## 2024-12-24 SDOH — SOCIAL STABILITY: SOCIAL INSECURITY: ABUSE: ADULT

## 2024-12-24 SDOH — SOCIAL STABILITY: SOCIAL INSECURITY: FAMILY BEHAVIORS: UNABLE TO ASSESS

## 2024-12-24 SDOH — SOCIAL STABILITY: SOCIAL INSECURITY: DO YOU FEEL ANYONE HAS EXPLOITED OR TAKEN ADVANTAGE OF YOU FINANCIALLY OR OF YOUR PERSONAL PROPERTY?: UNABLE TO ASSESS

## 2024-12-24 SDOH — HEALTH STABILITY: MENTAL HEALTH: DELUSIONS: CONTROLLED

## 2024-12-24 SDOH — HEALTH STABILITY: MENTAL HEALTH: HAVE YOU EVER DONE ANYTHING, STARTED TO DO ANYTHING, OR PREPARED TO DO ANYTHING TO END YOUR LIFE?: NO

## 2024-12-24 SDOH — SOCIAL STABILITY: SOCIAL INSECURITY: DOES ANYONE TRY TO KEEP YOU FROM HAVING/CONTACTING OTHER FRIENDS OR DOING THINGS OUTSIDE YOUR HOME?: UNABLE TO ASSESS

## 2024-12-24 SDOH — SOCIAL STABILITY: SOCIAL INSECURITY: DO YOU FEEL UNSAFE GOING BACK TO THE PLACE WHERE YOU ARE LIVING?: UNABLE TO ASSESS

## 2024-12-24 SDOH — HEALTH STABILITY: MENTAL HEALTH
OTHER SUICIDE PRECAUTIONS INCLUDE: PATIENT PLACED IN AN EASILY OBSERVABLE ROOM WITH DOOR/CURTAIN REMAINING OPEN;PATIENT PLACED IN GOWN (SNAPS OR PAPER GOWNS PREFERRED) AND WANDED;REMAINING RISKS IDENTIFIED AND MITIGATED;PATIENT PLACED IN PSYCH SAFE ROOM (IF AVAILABLE);PROVIDER NOTIFIED;EL

## 2024-12-24 SDOH — HEALTH STABILITY: MENTAL HEALTH: HAVE YOU WISHED YOU WERE DEAD OR WISHED YOU COULD GO TO SLEEP AND NOT WAKE UP?: NO

## 2024-12-24 SDOH — HEALTH STABILITY: MENTAL HEALTH: BEHAVIORAL HEALTH(WDL): EXCEPTIONS TO WDL

## 2024-12-24 SDOH — SOCIAL STABILITY: SOCIAL NETWORK: VISITOR BEHAVIORS: UNABLE TO ASSESS

## 2024-12-24 SDOH — SOCIAL STABILITY: SOCIAL INSECURITY: ARE YOU OR HAVE YOU BEEN THREATENED OR ABUSED PHYSICALLY, EMOTIONALLY, OR SEXUALLY BY ANYONE?: UNABLE TO ASSESS

## 2024-12-24 SDOH — SOCIAL STABILITY: SOCIAL NETWORK: PARENT/GUARDIAN/SIGNIFICANT OTHER INVOLVEMENT: NO INVOLVEMENT

## 2024-12-24 ASSESSMENT — ACTIVITIES OF DAILY LIVING (ADL)
WALKS IN HOME: NEEDS ASSISTANCE
GROOMING: NEEDS ASSISTANCE
ADEQUATE_TO_COMPLETE_ADL: NO
FEEDING YOURSELF: NEEDS ASSISTANCE
DRESSING YOURSELF: NEEDS ASSISTANCE
ASSISTIVE_DEVICE: WHEELCHAIR;WALKER
HEARING - RIGHT EAR: FUNCTIONAL
HEARING - LEFT EAR: FUNCTIONAL
JUDGMENT_ADEQUATE_SAFELY_COMPLETE_DAILY_ACTIVITIES: NO
BATHING: NEEDS ASSISTANCE
PATIENT'S MEMORY ADEQUATE TO SAFELY COMPLETE DAILY ACTIVITIES?: NO

## 2024-12-24 ASSESSMENT — PAIN - FUNCTIONAL ASSESSMENT
PAIN_FUNCTIONAL_ASSESSMENT: WONG-BAKER FACES
PAIN_FUNCTIONAL_ASSESSMENT: 0-10

## 2024-12-24 ASSESSMENT — LIFESTYLE VARIABLES
SKIP TO QUESTIONS 9-10: 0
HOW OFTEN DO YOU HAVE 6 OR MORE DRINKS ON ONE OCCASION: PATIENT UNABLE TO ANSWER
AUDIT-C TOTAL SCORE: -1
HOW MANY STANDARD DRINKS CONTAINING ALCOHOL DO YOU HAVE ON A TYPICAL DAY: PATIENT UNABLE TO ANSWER
HOW OFTEN DO YOU HAVE A DRINK CONTAINING ALCOHOL: PATIENT UNABLE TO ANSWER
AUDIT-C TOTAL SCORE: -1

## 2024-12-24 ASSESSMENT — COGNITIVE AND FUNCTIONAL STATUS - GENERAL: PATIENT BASELINE BEDBOUND: UNABLE TO ASSESS AT THIS TIME

## 2024-12-24 ASSESSMENT — PATIENT HEALTH QUESTIONNAIRE - PHQ9
2. FEELING DOWN, DEPRESSED OR HOPELESS: NOT AT ALL
1. LITTLE INTEREST OR PLEASURE IN DOING THINGS: NOT AT ALL
SUM OF ALL RESPONSES TO PHQ9 QUESTIONS 1 & 2: 0

## 2024-12-24 ASSESSMENT — PAIN SCALES - WONG BAKER
WONGBAKER_NUMERICALRESPONSE: NO HURT
WONGBAKER_NUMERICALRESPONSE: NO HURT

## 2024-12-25 PROBLEM — N17.9 ACUTE KIDNEY INJURY (CMS-HCC): Status: ACTIVE | Noted: 2024-12-25

## 2024-12-25 PROBLEM — G93.41 ACUTE METABOLIC ENCEPHALOPATHY: Status: ACTIVE | Noted: 2024-07-01

## 2024-12-25 PROBLEM — S72.141G: Status: ACTIVE | Noted: 2024-12-25

## 2024-12-25 PROBLEM — F25.9 SCHIZOAFFECTIVE DISORDER (MULTI): Status: ACTIVE | Noted: 2024-12-25

## 2024-12-25 LAB
ANION GAP SERPL CALC-SCNC: 10 MMOL/L (ref 10–20)
APPEARANCE UR: ABNORMAL
BACTERIA #/AREA URNS AUTO: ABNORMAL /HPF
BILIRUB UR STRIP.AUTO-MCNC: NEGATIVE MG/DL
BUN SERPL-MCNC: 32 MG/DL (ref 6–23)
CALCIUM SERPL-MCNC: 8.5 MG/DL (ref 8.6–10.3)
CAOX CRY #/AREA UR COMP ASSIST: ABNORMAL /HPF
CHLORIDE SERPL-SCNC: 109 MMOL/L (ref 98–107)
CO2 SERPL-SCNC: 28 MMOL/L (ref 21–32)
COLOR UR: ABNORMAL
CREAT SERPL-MCNC: 1.12 MG/DL (ref 0.5–1.3)
EGFRCR SERPLBLD CKD-EPI 2021: 64 ML/MIN/1.73M*2
ERYTHROCYTE [DISTWIDTH] IN BLOOD BY AUTOMATED COUNT: 13.8 % (ref 11.5–14.5)
GLUCOSE SERPL-MCNC: 87 MG/DL (ref 74–99)
GLUCOSE UR STRIP.AUTO-MCNC: NORMAL MG/DL
HCT VFR BLD AUTO: 35.5 % (ref 41–52)
HGB BLD-MCNC: 11.8 G/DL (ref 13.5–17.5)
HOLD SPECIMEN: NORMAL
KETONES UR STRIP.AUTO-MCNC: NEGATIVE MG/DL
LEUKOCYTE ESTERASE UR QL STRIP.AUTO: ABNORMAL
MAGNESIUM SERPL-MCNC: 1.64 MG/DL (ref 1.6–2.4)
MCH RBC QN AUTO: 31.1 PG (ref 26–34)
MCHC RBC AUTO-ENTMCNC: 33.2 G/DL (ref 32–36)
MCV RBC AUTO: 94 FL (ref 80–100)
NITRITE UR QL STRIP.AUTO: NEGATIVE
NRBC BLD-RTO: 0 /100 WBCS (ref 0–0)
PH UR STRIP.AUTO: 6 [PH]
PLATELET # BLD AUTO: 210 X10*3/UL (ref 150–450)
POTASSIUM SERPL-SCNC: 3.4 MMOL/L (ref 3.5–5.3)
PROT UR STRIP.AUTO-MCNC: ABNORMAL MG/DL
RBC # BLD AUTO: 3.79 X10*6/UL (ref 4.5–5.9)
RBC # UR STRIP.AUTO: ABNORMAL /UL
RBC #/AREA URNS AUTO: >20 /HPF
SODIUM SERPL-SCNC: 144 MMOL/L (ref 136–145)
SP GR UR STRIP.AUTO: 1.02
UROBILINOGEN UR STRIP.AUTO-MCNC: ABNORMAL MG/DL
WBC # BLD AUTO: 5.1 X10*3/UL (ref 4.4–11.3)
WBC #/AREA URNS AUTO: >50 /HPF
WBC CLUMPS #/AREA URNS AUTO: ABNORMAL /HPF

## 2024-12-25 PROCEDURE — 80048 BASIC METABOLIC PNL TOTAL CA: CPT | Performed by: INTERNAL MEDICINE

## 2024-12-25 PROCEDURE — 99232 SBSQ HOSP IP/OBS MODERATE 35: CPT | Performed by: HOSPITALIST

## 2024-12-25 PROCEDURE — 2500000001 HC RX 250 WO HCPCS SELF ADMINISTERED DRUGS (ALT 637 FOR MEDICARE OP): Performed by: INTERNAL MEDICINE

## 2024-12-25 PROCEDURE — 2500000001 HC RX 250 WO HCPCS SELF ADMINISTERED DRUGS (ALT 637 FOR MEDICARE OP): Performed by: HOSPITALIST

## 2024-12-25 PROCEDURE — 2500000004 HC RX 250 GENERAL PHARMACY W/ HCPCS (ALT 636 FOR OP/ED): Performed by: INTERNAL MEDICINE

## 2024-12-25 PROCEDURE — 1200000002 HC GENERAL ROOM WITH TELEMETRY DAILY

## 2024-12-25 PROCEDURE — 2500000004 HC RX 250 GENERAL PHARMACY W/ HCPCS (ALT 636 FOR OP/ED): Performed by: HOSPITALIST

## 2024-12-25 PROCEDURE — 85027 COMPLETE CBC AUTOMATED: CPT | Performed by: INTERNAL MEDICINE

## 2024-12-25 PROCEDURE — 36415 COLL VENOUS BLD VENIPUNCTURE: CPT | Performed by: INTERNAL MEDICINE

## 2024-12-25 PROCEDURE — 81001 URINALYSIS AUTO W/SCOPE: CPT | Performed by: PHYSICIAN ASSISTANT

## 2024-12-25 PROCEDURE — 2500000002 HC RX 250 W HCPCS SELF ADMINISTERED DRUGS (ALT 637 FOR MEDICARE OP, ALT 636 FOR OP/ED): Performed by: INTERNAL MEDICINE

## 2024-12-25 PROCEDURE — 2500000002 HC RX 250 W HCPCS SELF ADMINISTERED DRUGS (ALT 637 FOR MEDICARE OP, ALT 636 FOR OP/ED): Performed by: HOSPITALIST

## 2024-12-25 PROCEDURE — 51701 INSERT BLADDER CATHETER: CPT

## 2024-12-25 PROCEDURE — 83735 ASSAY OF MAGNESIUM: CPT | Performed by: HOSPITALIST

## 2024-12-25 PROCEDURE — 87086 URINE CULTURE/COLONY COUNT: CPT | Mod: ELYLAB | Performed by: PHYSICIAN ASSISTANT

## 2024-12-25 RX ORDER — FINASTERIDE 5 MG/1
5 TABLET, FILM COATED ORAL NIGHTLY
Status: DISCONTINUED | OUTPATIENT
Start: 2024-12-25 | End: 2024-12-27 | Stop reason: HOSPADM

## 2024-12-25 RX ORDER — FLUDROCORTISONE ACETATE 0.1 MG/1
0.2 TABLET ORAL DAILY
Status: DISCONTINUED | OUTPATIENT
Start: 2024-12-25 | End: 2024-12-27 | Stop reason: HOSPADM

## 2024-12-25 RX ORDER — POTASSIUM CHLORIDE 14.9 MG/ML
20 INJECTION INTRAVENOUS ONCE
Status: COMPLETED | OUTPATIENT
Start: 2024-12-25 | End: 2024-12-25

## 2024-12-25 RX ORDER — SERTRALINE HYDROCHLORIDE 50 MG/1
100 TABLET, FILM COATED ORAL DAILY
Status: DISCONTINUED | OUTPATIENT
Start: 2024-12-25 | End: 2024-12-27 | Stop reason: HOSPADM

## 2024-12-25 RX ORDER — SODIUM CHLORIDE, SODIUM LACTATE, POTASSIUM CHLORIDE, CALCIUM CHLORIDE 600; 310; 30; 20 MG/100ML; MG/100ML; MG/100ML; MG/100ML
100 INJECTION, SOLUTION INTRAVENOUS CONTINUOUS
Status: ACTIVE | OUTPATIENT
Start: 2024-12-25 | End: 2024-12-26

## 2024-12-25 RX ORDER — LANOLIN ALCOHOL/MO/W.PET/CERES
100 CREAM (GRAM) TOPICAL DAILY
Status: DISCONTINUED | OUTPATIENT
Start: 2024-12-25 | End: 2024-12-27 | Stop reason: HOSPADM

## 2024-12-25 RX ORDER — FOLIC ACID 1 MG/1
1 TABLET ORAL DAILY
Status: DISCONTINUED | OUTPATIENT
Start: 2024-12-25 | End: 2024-12-27 | Stop reason: HOSPADM

## 2024-12-25 RX ORDER — CEFTRIAXONE 1 G/50ML
1 INJECTION, SOLUTION INTRAVENOUS EVERY 24 HOURS
Status: DISCONTINUED | OUTPATIENT
Start: 2024-12-25 | End: 2024-12-27 | Stop reason: HOSPADM

## 2024-12-25 RX ORDER — LAMOTRIGINE 100 MG/1
400 TABLET ORAL DAILY
Status: DISCONTINUED | OUTPATIENT
Start: 2024-12-25 | End: 2024-12-25

## 2024-12-25 RX ORDER — LORAZEPAM 0.5 MG/1
0.5 TABLET ORAL EVERY 6 HOURS PRN
Status: DISCONTINUED | OUTPATIENT
Start: 2024-12-25 | End: 2024-12-27 | Stop reason: HOSPADM

## 2024-12-25 RX ORDER — LACTULOSE 10 G/15ML
10 SOLUTION ORAL DAILY
Status: DISCONTINUED | OUTPATIENT
Start: 2024-12-25 | End: 2024-12-27 | Stop reason: HOSPADM

## 2024-12-25 RX ORDER — ASPIRIN 81 MG/1
81 TABLET ORAL DAILY
Status: DISCONTINUED | OUTPATIENT
Start: 2024-12-25 | End: 2024-12-27 | Stop reason: HOSPADM

## 2024-12-25 RX ORDER — OLANZAPINE 5 MG/1
20 TABLET ORAL NIGHTLY
Status: DISCONTINUED | OUTPATIENT
Start: 2024-12-25 | End: 2024-12-27 | Stop reason: HOSPADM

## 2024-12-25 RX ORDER — TRAZODONE HYDROCHLORIDE 50 MG/1
100 TABLET ORAL NIGHTLY
Status: DISCONTINUED | OUTPATIENT
Start: 2024-12-25 | End: 2024-12-27 | Stop reason: HOSPADM

## 2024-12-25 RX ORDER — OLANZAPINE 10 MG/2ML
5 INJECTION, POWDER, FOR SOLUTION INTRAMUSCULAR EVERY 6 HOURS PRN
Status: DISCONTINUED | OUTPATIENT
Start: 2024-12-25 | End: 2024-12-27 | Stop reason: HOSPADM

## 2024-12-25 RX ORDER — LAMOTRIGINE 100 MG/1
400 TABLET ORAL 2 TIMES DAILY
Status: DISCONTINUED | OUTPATIENT
Start: 2024-12-25 | End: 2024-12-27 | Stop reason: HOSPADM

## 2024-12-25 RX ORDER — LAMOTRIGINE 100 MG/1
400 TABLET ORAL NIGHTLY
Status: DISCONTINUED | OUTPATIENT
Start: 2024-12-25 | End: 2024-12-25

## 2024-12-25 RX ORDER — PHENYTOIN SODIUM 100 MG/1
200 CAPSULE, EXTENDED RELEASE ORAL 2 TIMES DAILY
Status: DISCONTINUED | OUTPATIENT
Start: 2024-12-25 | End: 2024-12-27 | Stop reason: HOSPADM

## 2024-12-25 RX ORDER — MECLIZINE HCL 12.5 MG 12.5 MG/1
12.5 TABLET ORAL 2 TIMES DAILY
Status: DISCONTINUED | OUTPATIENT
Start: 2024-12-25 | End: 2024-12-27 | Stop reason: HOSPADM

## 2024-12-25 RX ADMIN — CEFTRIAXONE SODIUM 1 G: 1 INJECTION, SOLUTION INTRAVENOUS at 03:37

## 2024-12-25 RX ADMIN — OLANZAPINE 5 MG: 10 INJECTION, POWDER, FOR SOLUTION INTRAMUSCULAR at 12:54

## 2024-12-25 RX ADMIN — Medication 100 MG: at 09:00

## 2024-12-25 RX ADMIN — TRAZODONE HYDROCHLORIDE 100 MG: 50 TABLET ORAL at 20:26

## 2024-12-25 RX ADMIN — LAMOTRIGINE 400 MG: 100 TABLET ORAL at 09:00

## 2024-12-25 RX ADMIN — LAMOTRIGINE 400 MG: 100 TABLET ORAL at 20:26

## 2024-12-25 RX ADMIN — OLANZAPINE 20 MG: 5 TABLET, FILM COATED ORAL at 20:26

## 2024-12-25 RX ADMIN — PHENYTOIN SODIUM 200 MG: 100 CAPSULE, EXTENDED RELEASE ORAL at 20:26

## 2024-12-25 RX ADMIN — LAMOTRIGINE 400 MG: 100 TABLET ORAL at 01:22

## 2024-12-25 RX ADMIN — MECLIZINE HCL 12.5 MG 12.5 MG: 12.5 TABLET ORAL at 20:26

## 2024-12-25 RX ADMIN — OLANZAPINE 5 MG: 10 INJECTION, POWDER, FOR SOLUTION INTRAMUSCULAR at 01:56

## 2024-12-25 RX ADMIN — FINASTERIDE 5 MG: 5 TABLET, FILM COATED ORAL at 20:26

## 2024-12-25 RX ADMIN — PHENYTOIN SODIUM 200 MG: 100 CAPSULE, EXTENDED RELEASE ORAL at 09:15

## 2024-12-25 RX ADMIN — SODIUM CHLORIDE, POTASSIUM CHLORIDE, SODIUM LACTATE AND CALCIUM CHLORIDE 100 ML/HR: 600; 310; 30; 20 INJECTION, SOLUTION INTRAVENOUS at 22:07

## 2024-12-25 RX ADMIN — PHENYTOIN SODIUM 200 MG: 100 CAPSULE, EXTENDED RELEASE ORAL at 01:26

## 2024-12-25 RX ADMIN — POTASSIUM CHLORIDE 20 MEQ: 14.9 INJECTION, SOLUTION INTRAVENOUS at 08:59

## 2024-12-25 RX ADMIN — FOLIC ACID 1 MG: 1 TABLET ORAL at 09:01

## 2024-12-25 RX ADMIN — SERTRALINE HYDROCHLORIDE 100 MG: 50 TABLET, FILM COATED ORAL at 09:00

## 2024-12-25 RX ADMIN — ASPIRIN 81 MG: 81 TABLET, COATED ORAL at 09:00

## 2024-12-25 RX ADMIN — SODIUM CHLORIDE, POTASSIUM CHLORIDE, SODIUM LACTATE AND CALCIUM CHLORIDE 100 ML/HR: 600; 310; 30; 20 INJECTION, SOLUTION INTRAVENOUS at 02:00

## 2024-12-25 RX ADMIN — LORAZEPAM 0.5 MG: 0.5 TABLET ORAL at 09:01

## 2024-12-25 SDOH — HEALTH STABILITY: PHYSICAL HEALTH
HOW OFTEN DO YOU NEED TO HAVE SOMEONE HELP YOU WHEN YOU READ INSTRUCTIONS, PAMPHLETS, OR OTHER WRITTEN MATERIAL FROM YOUR DOCTOR OR PHARMACY?: PATIENT UNABLE TO RESPOND

## 2024-12-25 SDOH — SOCIAL STABILITY: SOCIAL INSECURITY
WITHIN THE LAST YEAR, HAVE YOU BEEN RAPED OR FORCED TO HAVE ANY KIND OF SEXUAL ACTIVITY BY YOUR PARTNER OR EX-PARTNER?: PATIENT UNABLE TO ANSWER

## 2024-12-25 SDOH — ECONOMIC STABILITY: HOUSING INSECURITY
IN THE LAST 12 MONTHS, WAS THERE A TIME WHEN YOU WERE NOT ABLE TO PAY THE MORTGAGE OR RENT ON TIME?: PATIENT UNABLE TO ANSWER

## 2024-12-25 SDOH — ECONOMIC STABILITY: FOOD INSECURITY: WITHIN THE PAST 12 MONTHS, YOU WORRIED THAT YOUR FOOD WOULD RUN OUT BEFORE YOU GOT THE MONEY TO BUY MORE.: NEVER TRUE

## 2024-12-25 SDOH — ECONOMIC STABILITY: FOOD INSECURITY: WITHIN THE PAST 12 MONTHS, THE FOOD YOU BOUGHT JUST DIDN'T LAST AND YOU DIDN'T HAVE MONEY TO GET MORE.: NEVER TRUE

## 2024-12-25 SDOH — ECONOMIC STABILITY: INCOME INSECURITY
IN THE PAST 12 MONTHS HAS THE ELECTRIC, GAS, OIL, OR WATER COMPANY THREATENED TO SHUT OFF SERVICES IN YOUR HOME?: PATIENT UNABLE TO ANSWER

## 2024-12-25 SDOH — SOCIAL STABILITY: SOCIAL INSECURITY
WITHIN THE LAST YEAR, HAVE YOU BEEN KICKED, HIT, SLAPPED, OR OTHERWISE PHYSICALLY HURT BY YOUR PARTNER OR EX-PARTNER?: PATIENT UNABLE TO ANSWER

## 2024-12-25 SDOH — ECONOMIC STABILITY: FOOD INSECURITY
HOW HARD IS IT FOR YOU TO PAY FOR THE VERY BASICS LIKE FOOD, HOUSING, MEDICAL CARE, AND HEATING?: PATIENT UNABLE TO ANSWER

## 2024-12-25 SDOH — SOCIAL STABILITY: SOCIAL INSECURITY: WITHIN THE LAST YEAR, HAVE YOU BEEN AFRAID OF YOUR PARTNER OR EX-PARTNER?: PATIENT UNABLE TO ANSWER

## 2024-12-25 SDOH — ECONOMIC STABILITY: HOUSING INSECURITY: AT ANY TIME IN THE PAST 12 MONTHS, WERE YOU HOMELESS OR LIVING IN A SHELTER (INCLUDING NOW)?: PATIENT UNABLE TO ANSWER

## 2024-12-25 SDOH — SOCIAL STABILITY: SOCIAL INSECURITY
WITHIN THE LAST YEAR, HAVE YOU BEEN HUMILIATED OR EMOTIONALLY ABUSED IN OTHER WAYS BY YOUR PARTNER OR EX-PARTNER?: PATIENT UNABLE TO ANSWER

## 2024-12-25 SDOH — ECONOMIC STABILITY: TRANSPORTATION INSECURITY
IN THE PAST 12 MONTHS, HAS LACK OF TRANSPORTATION KEPT YOU FROM MEDICAL APPOINTMENTS OR FROM GETTING MEDICATIONS?: PATIENT UNABLE TO ANSWER

## 2024-12-25 ASSESSMENT — COGNITIVE AND FUNCTIONAL STATUS - GENERAL
STANDING UP FROM CHAIR USING ARMS: A LOT
WALKING IN HOSPITAL ROOM: TOTAL
PERSONAL GROOMING: TOTAL
DRESSING REGULAR UPPER BODY CLOTHING: TOTAL
MOBILITY SCORE: 14
MOVING TO AND FROM BED TO CHAIR: A LOT
HELP NEEDED FOR BATHING: TOTAL
DAILY ACTIVITIY SCORE: 6
TOILETING: TOTAL
CLIMB 3 TO 5 STEPS WITH RAILING: TOTAL
EATING MEALS: TOTAL
DRESSING REGULAR LOWER BODY CLOTHING: TOTAL

## 2024-12-25 ASSESSMENT — ACTIVITIES OF DAILY LIVING (ADL)
LACK_OF_TRANSPORTATION: PATIENT UNABLE TO ANSWER
LACK_OF_TRANSPORTATION: PATIENT UNABLE TO ANSWER

## 2024-12-25 NOTE — ED PROVIDER NOTES
HPI   Chief Complaint   Patient presents with   • Aggressive Behavior     EMS was called to SNF due to aggressive behavior pt has been having all day. Per nursing facility, pt slid out of wheelchair earlier today. Pt has hx of schizophrenia        The patient is an 84-year-old male sent in by the skilled nursing facility with report of fall with increased agitation.  According to EMS the patient was seen sliding out of his wheelchair onto the ground.  There was no head injury or loss of consciousness.  Nursing home reports that after he fell he seemed more agitated and began throwing things at the nursing staff.  Patient has a history of vascular dementia and schizophrenia with a history of behavioral disturbance and is unable to provide any history.  Patient's history was obtained from EMS personnel and the patient's nursing home record as well as his EMR.  The patient is not on blood thinners.      History provided by:  EMS personnel, medical records and nursing home  History limited by:  Dementia          Patient History   History reviewed. No pertinent past medical history.  History reviewed. No pertinent surgical history.  No family history on file.  Social History     Tobacco Use   • Smoking status: Unknown   • Smokeless tobacco: Not on file   Substance Use Topics   • Alcohol use: Not on file   • Drug use: Not on file       Physical Exam   ED Triage Vitals [12/24/24 1928]   Temperature Heart Rate Respirations BP   36.4 °C (97.5 °F) 78 14 115/59      Pulse Ox Temp Source Heart Rate Source Patient Position   95 % Temporal Monitor --      BP Location FiO2 (%)     -- --       Physical Exam  Vitals and nursing note reviewed.   Constitutional:       General: He is not in acute distress.     Appearance: Normal appearance. He is well-developed and well-groomed. He is not ill-appearing, toxic-appearing or diaphoretic.      Comments: Patient is awake, he is alert to self, he does follow commands and is acting  appropriate, shows no signs of distress.   HENT:      Head: Normocephalic and atraumatic.      Jaw: There is normal jaw occlusion.      Right Ear: Hearing, tympanic membrane, ear canal and external ear normal.      Left Ear: Hearing, tympanic membrane, ear canal and external ear normal.      Nose: Nose normal. No signs of injury or nasal tenderness.      Mouth/Throat:      Lips: Pink.      Mouth: Mucous membranes are moist.      Pharynx: Oropharynx is clear. Uvula midline.   Eyes:      Conjunctiva/sclera: Conjunctivae normal.      Pupils: Pupils are equal, round, and reactive to light.   Cardiovascular:      Rate and Rhythm: Normal rate and regular rhythm.      Pulses: Normal pulses.      Heart sounds: Normal heart sounds.   Pulmonary:      Effort: Pulmonary effort is normal.      Breath sounds: Normal breath sounds. No wheezing, rhonchi or rales.   Chest:      Chest wall: No tenderness.   Abdominal:      General: Abdomen is flat. Bowel sounds are normal. There is no distension.      Palpations: Abdomen is soft. There is no mass.      Tenderness: There is no abdominal tenderness. There is no right CVA tenderness, left CVA tenderness, guarding or rebound.      Hernia: No hernia is present.   Musculoskeletal:         General: Tenderness present.      Right shoulder: Normal. No bony tenderness.      Left shoulder: Normal. No bony tenderness.      Right upper arm: Normal. No tenderness or bony tenderness.      Left upper arm: Normal. No tenderness or bony tenderness.      Right elbow: Normal. Normal range of motion. No tenderness.      Left elbow: Normal. Normal range of motion. No tenderness.      Right forearm: Normal. No tenderness or bony tenderness.      Left forearm: Normal. No tenderness or bony tenderness.      Right wrist: Normal. No tenderness or bony tenderness.      Left wrist: Normal. No tenderness or bony tenderness.      Right hand: Normal. No tenderness or bony tenderness.      Left hand: Normal. No  tenderness or bony tenderness.      Cervical back: Normal, normal range of motion and neck supple. No tenderness.      Thoracic back: Normal. No tenderness or bony tenderness.      Lumbar back: Normal. No tenderness or bony tenderness.      Right hip: Bony tenderness present.      Left hip: Normal. No bony tenderness.      Right upper leg: Normal. No tenderness or bony tenderness.      Left upper leg: Normal. No tenderness or bony tenderness.      Right knee: Normal. No bony tenderness. No tenderness.      Left knee: Normal. No bony tenderness. No tenderness.      Right lower leg: No swelling or bony tenderness. No edema.      Left lower leg: No swelling or bony tenderness. No edema.      Right ankle: Normal. No tenderness.      Left ankle: Normal. No tenderness.      Right foot: Normal. No tenderness or bony tenderness.      Left foot: Normal. No tenderness or bony tenderness.        Legs:       Comments: Scabbed abrasions both lower extremities anterior tibial region   Skin:     General: Skin is warm and dry.      Capillary Refill: Capillary refill takes less than 2 seconds.      Findings: Abrasion present.          Neurological:      Mental Status: He is alert. Mental status is at baseline. He is disoriented and confused.      Sensory: Sensation is intact.      Motor: Motor function is intact.      Coordination: Coordination is intact.   Psychiatric:         Attention and Perception: He is inattentive.         Mood and Affect: Mood normal. Affect is flat.         Speech: Speech is delayed.         Behavior: Behavior is slowed. Behavior is cooperative.         Cognition and Memory: Cognition is impaired. Memory is impaired. He exhibits impaired recent memory and impaired remote memory.         ED Course & MDM   Diagnoses as of 12/24/24 2218   Fall at nursing home, initial encounter   Closed displaced fracture of greater trochanter of right femur, initial encounter   Contusion of right hip, initial encounter    Acute renal failure, unspecified acute renal failure type (CMS-HCC)                 No data recorded     Emili Coma Scale Score: 14 (12/24/24 1932 : Cesia Nguyen RN)                           Medical Decision Making  Temperature 36.4 heart rate 78 respirations 14 blood pressure 115/59 pulse ox is 95% on room air    White count 6 hemoglobin 12.8 hematocrit 39 platelet count was 237, chemistry potassium 3.4, chloride 109, BUN 35 creatinine 1.54 with a GFR 44, alk phos 139 ALT of 7.  Labs consistent with acute renal insufficiency.  I have ordered a liter of normal saline wide open.  CT scan of the head shows no evidence of acute intracranial hemorrhage or depressed skull fracture, cerebral atrophy with chronic ischemic changes.  X-ray of the chest shows stable asymmetric elevation right hemidiaphragm and basilar subsegmental atelectasis, x-ray of the pelvis shows no definitive evidence of a displaced fracture, osteopenia.  On repeat exam the patient was still having some tenderness to his pelvis I ordered a CT scan without contrast.  Results show a displaced right greater trochanteric fracture as well as urinary bladder wall thickening and trabeculation.  Patient was given a liter of lactated Ringer's wide open.  CT scan of the pelvis shows a displaced right greater trochanteric fracture there is also signs of osteopenia.  I discussed results of workup with the patient.  I spoke with Dr. Hull who will accept the patient to his service and will consult orthopedics.        Procedure  Procedures     Yovany Vu PA-C  12/24/24 8308

## 2024-12-25 NOTE — PROGRESS NOTES
"PROGRESS NOTE    ASSESSMENT AND PLAN:      Mechanical fall at nursing home  Right hip fracture  -He was brought in to the emergency room after a fall.  -His CT of his pelvis demonstrates a displaced right greater trochanteric fracture.  Plan:  -Seen by orthopedic surgery, MRI of pelvis ordered  -Npo after midnight, for possible surgical intervention    Acute metabolic encephalopathy secondary to UTI superimposed on dementia  Urinary tract infection  History of vascular dementia  History of schizoaffective disorder  -Patient appears to be confused, combative.  -Continue ceftriaxone IV for UTI  -Continue Zyprexa, Ativan, Dilantin, Zoloft, trazodone, Lamictal a    Acute kidney injury  -Most likely prerenal etiology from dehydration  -Serum creatinine back to his baseline  -Continue LR at 100 cc an hour    History of orthostatic hypotension  -Continue Florinef 0.2 mg daily      SUBJECTIVE:   Admit Date: 12/24/2024    Interval History: Confused, no active issues.      OBJECTIVE:   Vitals: /63   Pulse 60   Temp 36.7 °C (98.1 °F) (Temporal)   Resp 16   Ht 1.829 m (6' 0.01\")   Wt 75.3 kg (166 lb 0.1 oz)   SpO2 96%   BMI 22.51 kg/m²    Wt Readings from Last 3 Encounters:   12/24/24 75.3 kg (166 lb 0.1 oz)   07/01/24 82.4 kg (181 lb 10.5 oz)      24HR INTAKE/OUTPUT:    Intake/Output Summary (Last 24 hours) at 12/25/2024 1449  Last data filed at 12/25/2024 1110  Gross per 24 hour   Intake 100 ml   Output 400 ml   Net -300 ml       PHYSICAL EXAM:   GENERAL: Laying in bed, does not appear to be in any distress.   HEENT: HEAD: Normocephalic atraumatic.  Neck: Supple.  Eyes: Pupils are reactive to direct light.   CVS: S1, S2 heard. Regular rate and rhythm  LUNGS: Clear to auscultate bilaterally. No wheezing or rhonchi appreciated.  ABDOMEN: Soft, nontender to palpate. Positive bowel sounds. No guarding or rebound appreciated.  NEUROLOGICAL: No focal neurological deficits appreciated. Cranial nerves are grossly " "intact.  EXTREMITIES: Right hip is externally rotated.  SKIN:  Grossly intact, warm and dry.      LABS/IMAGING AND MEDICATIONS:   Scheduled Meds:aspirin, 81 mg, oral, Daily  cefTRIAXone, 1 g, intravenous, q24h  folic acid, 1 mg, oral, Daily  lamoTRIgine, 400 mg, oral, BID  OLANZapine, 20 mg, oral, Nightly  phenytoin ER, 200 mg, oral, BID  sertraline, 100 mg, oral, Daily  thiamine, 100 mg, oral, Daily      PRN Meds:PRN medications: acetaminophen **OR** acetaminophen **OR** acetaminophen, LORazepam, melatonin, OLANZapine, ondansetron **OR** ondansetron, polyethylene glycol    No lab exists for component: \"CBC\"   No lab exists for component: \"CMP\"   No lab exists for component: \"TROPONIN\"          No lab exists for component: \"LIPIDS\"       No lab exists for component: \"URINALYSIS\"          BMP:  Results from last 7 days   Lab Units 12/25/24  0540 12/24/24 2004   SODIUM mmol/L 144 143   POTASSIUM mmol/L 3.4* 3.4*   CHLORIDE mmol/L 109* 109*   CO2 mmol/L 28 27   BUN mg/dL 32* 35*   CREATININE mg/dL 1.12 1.54*       CBC:  Results from last 7 days   Lab Units 12/25/24  0540 12/24/24 2004   WBC AUTO x10*3/uL 5.1 6.0   RBC AUTO x10*6/uL 3.79* 4.18*   HEMOGLOBIN g/dL 11.8* 12.8*   HEMATOCRIT % 35.5* 39.0*   MCV fL 94 93   MCH pg 31.1 30.6   MCHC g/dL 33.2 32.8   RDW % 13.8 13.7   PLATELETS AUTO x10*3/uL 210 237       Cardiac Enzymes:           Hepatic Function Panel:  Results from last 7 days   Lab Units 12/24/24 2004   ALK PHOS U/L 139*   ALT U/L 7*   AST U/L 14   PROTEIN TOTAL g/dL 6.9   BILIRUBIN TOTAL mg/dL 0.3       M  No lab exists for component: \"LACTA\"    CMP:  Results from last 7 days   Lab Units 12/25/24  0540 12/24/24 2004   SODIUM mmol/L 144 143   POTASSIUM mmol/L 3.4* 3.4*   CHLORIDE mmol/L 109* 109*   CO2 mmol/L 28 27   BUN mg/dL 32* 35*   CREATININE mg/dL 1.12 1.54*   GLUCOSE mg/dL 87 92   CALCIUM mg/dL 8.5* 9.2   PROTEIN TOTAL g/dL  --  6.9   BILIRUBIN TOTAL mg/dL  --  0.3   ALK PHOS U/L  --  139*   AST " U/L  --  14   ALT U/L  --  7*

## 2024-12-25 NOTE — H&P
History Of Present Illness  Willian Vela is a 84 y.o. male presenting with aggressive behavior from nursing home.  As per report he was agitated and appeared confused.  He did slide from his wheelchair and fell without hitting his head or losing consciousness.  After the fall he became more agitated.  He was throwing objects on the nurses staff.  Patient has history of dementia and history of schizophrenia and other behavioral disturbances.    ER course: Patient was awake, alert, was getting out of bed frequently.  Poorly cooperative.  He denies having pain.  He is poor historian.  He is not oriented to place or time.  Was able to say his name.  Imaging studies did show a closed displaced fracture of the greater trochanter of the right hip.  His labs shows acute kidney injury and clinically dehydrated as well.  He has mild hypokalemia 3.4.  Patient was given Zyprexa 5 mg in the ER that helped with his agitation.  Patient is admitted to medicine for further management and workup.    Past Medical History  He has a past medical history of BPH (benign prostatic hyperplasia), COPD (chronic obstructive pulmonary disease) (Multi), Delusional disorder (Multi), Dementia, Dry eye syndrome, Fracture of left clavicle, Generalized anxiety disorder, GERD (gastroesophageal reflux disease), Hearing loss, Insomnia, Low back pain, Major depressive disorder, Mood disorder (CMS-HCC), Orthostatic hypotension, Other seizures (Multi), Paranoid schizophrenia (Multi), Reduced mobility, Venous (peripheral) insufficiency, and Vitamin D deficiency.    Surgical History  He has no past surgical history on file.     Social History  He has no history on file for tobacco use, alcohol use, and drug use.    Family History  No family history on file.     Allergies  Tamsulosin    Review of Systems  10/10 points review of system were conducted, negative except as above    Physical Exam  Constitutional:       General: He is not in acute distress.      "Appearance: He is not ill-appearing or diaphoretic.   HENT:      Head: Normocephalic and atraumatic.      Nose: Nose normal.      Mouth/Throat:      Mouth: Mucous membranes are dry.      Pharynx: No oropharyngeal exudate.   Eyes:      General: No scleral icterus.     Extraocular Movements: Extraocular movements intact.      Conjunctiva/sclera: Conjunctivae normal.      Pupils: Pupils are equal, round, and reactive to light.   Cardiovascular:      Rate and Rhythm: Normal rate and regular rhythm.      Heart sounds: No murmur heard.  Pulmonary:      Breath sounds: No stridor. No wheezing, rhonchi or rales.   Chest:      Chest wall: No tenderness.   Abdominal:      General: There is no distension.      Palpations: There is no mass.      Tenderness: There is no abdominal tenderness. There is no right CVA tenderness, left CVA tenderness, guarding or rebound.      Hernia: No hernia is present.   Musculoskeletal:         General: No tenderness.      Cervical back: Normal range of motion and neck supple. No rigidity or tenderness.      Right lower leg: No edema.      Left lower leg: No edema.   Skin:     General: Skin is warm and dry.      Findings: Bruising and lesion present. No rash.   Neurological:      General: No focal deficit present.      Mental Status: He is alert. He is disoriented.      Gait: Gait abnormal.   Psychiatric:      Comments: Agitated, confused          Last Recorded Vitals  Blood pressure 107/55, pulse 70, temperature 36.6 °C (97.9 °F), resp. rate 14, height 1.829 m (6' 0.01\"), weight 75.3 kg (166 lb 0.1 oz), SpO2 99%.    Relevant Results  Scheduled medications  aspirin, 81 mg, oral, Daily  folic acid, 1 mg, oral, Daily  lamoTRIgine, 400 mg, oral, Nightly  lamoTRIgine, 400 mg, oral, Daily  OLANZapine, 20 mg, oral, Nightly  phenytoin ER, 200 mg, oral, BID  sertraline, 100 mg, oral, Daily  thiamine, 100 mg, oral, Daily      Continuous medications  lactated Ringer's, 100 mL/hr      PRN medications  PRN " medications: acetaminophen **OR** acetaminophen **OR** acetaminophen, LORazepam, melatonin, ondansetron **OR** ondansetron, polyethylene glycol     Results for orders placed or performed during the hospital encounter of 12/24/24 (from the past 24 hours)   CBC and Auto Differential   Result Value Ref Range    WBC 6.0 4.4 - 11.3 x10*3/uL    nRBC 0.0 0.0 - 0.0 /100 WBCs    RBC 4.18 (L) 4.50 - 5.90 x10*6/uL    Hemoglobin 12.8 (L) 13.5 - 17.5 g/dL    Hematocrit 39.0 (L) 41.0 - 52.0 %    MCV 93 80 - 100 fL    MCH 30.6 26.0 - 34.0 pg    MCHC 32.8 32.0 - 36.0 g/dL    RDW 13.7 11.5 - 14.5 %    Platelets 237 150 - 450 x10*3/uL    Neutrophils % 62.7 40.0 - 80.0 %    Immature Granulocytes %, Automated 0.2 0.0 - 0.9 %    Lymphocytes % 27.1 13.0 - 44.0 %    Monocytes % 8.0 2.0 - 10.0 %    Eosinophils % 1.7 0.0 - 6.0 %    Basophils % 0.3 0.0 - 2.0 %    Neutrophils Absolute 3.75 1.60 - 5.50 x10*3/uL    Immature Granulocytes Absolute, Automated 0.01 0.00 - 0.50 x10*3/uL    Lymphocytes Absolute 1.62 0.80 - 3.00 x10*3/uL    Monocytes Absolute 0.48 0.05 - 0.80 x10*3/uL    Eosinophils Absolute 0.10 0.00 - 0.40 x10*3/uL    Basophils Absolute 0.02 0.00 - 0.10 x10*3/uL   Comprehensive metabolic panel   Result Value Ref Range    Glucose 92 74 - 99 mg/dL    Sodium 143 136 - 145 mmol/L    Potassium 3.4 (L) 3.5 - 5.3 mmol/L    Chloride 109 (H) 98 - 107 mmol/L    Bicarbonate 27 21 - 32 mmol/L    Anion Gap 10 10 - 20 mmol/L    Urea Nitrogen 35 (H) 6 - 23 mg/dL    Creatinine 1.54 (H) 0.50 - 1.30 mg/dL    eGFR 44 (L) >60 mL/min/1.73m*2    Calcium 9.2 8.6 - 10.3 mg/dL    Albumin 3.9 3.4 - 5.0 g/dL    Alkaline Phosphatase 139 (H) 33 - 136 U/L    Total Protein 6.9 6.4 - 8.2 g/dL    AST 14 9 - 39 U/L    Bilirubin, Total 0.3 0.0 - 1.2 mg/dL    ALT 7 (L) 10 - 52 U/L      CT pelvis wo IV contrast    Result Date: 12/24/2024  Interpreted By:  Eligio Flores, STUDY: CT PELVIS WO IV CONTRAST;  12/24/2024 9:21 pm   INDICATION: Signs/Symptoms:Fall at nursing  home concern for occult fracture.     COMPARISON: None.   ACCESSION NUMBER(S): [id number]   ORDERING CLINICIAN: MARTELL MCGRAW   TECHNIQUE: Contiguous axial images of the pelvis were obtained without intravenous contrast. Coronal and sagittal reformatted images were obtained from the axial images.   FINDINGS: There is displaced fracture of the right greater trochanter. There is a osteopenia. There is bilateral hip osteoarthrosis. Degenerative change of the lower lumbar spine.   Limited evaluation the soft tissues of the pelvis. There is evidence of urinary bladder wall thickening and trabeculation.       Displaced right greater trochanter fracture.   Urinary bladder wall thickening and trabeculation.   MACRO: None   Signed by: Eligio Flores 12/24/2024 9:57 PM Dictation workstation:   [id number]    XR pelvis 1-2 views    Result Date: 12/24/2024  Interpreted By:  Eligio Flores, STUDY: XR PELVIS 1-2 VIEWS; ;  12/24/2024 8:07 pm   INDICATION: Signs/Symptoms:fall at NH.     COMPARISON: 6/29/2023   ACCESSION NUMBER(S): [id number]   ORDERING CLINICIAN: MARTELL MCGRAW   FINDINGS: No definite evidence of acute displaced pelvic fracture. There is osteopenia. Mild bilateral hip osteoarthrosis.       No definite evidence of acute displaced pelvic fracture.   Osteopenia.   If the patient is acutely unable to bear weight or pain increases, cross-sectional imaging is recommended to exclude radiographically occult fracture.     MACRO: None   Signed by: Eligio Flores 12/24/2024 8:23 PM Dictation workstation:   [id number]    XR chest 1 view    Result Date: 12/24/2024  Interpreted By:  Eligio Flores, STUDY: XR CHEST 1 VIEW;  12/24/2024 8:07 pm   INDICATION: Signs/Symptoms:cough.   COMPARISON: CXR 7/1/2024, right shoulder 9/8/2023   ACCESSION NUMBER(S): [id number]   ORDERING CLINICIAN: MARTELL MCGRAW   FINDINGS: The cardiac silhouette is normal in size. Stable asymmetric elevation of right diaphragm and basilar subsegmental  atelectasis. No significant pleural effusion. No pneumothorax. Chronic fracture deformity of right humeral neck and distal right clavicle       Stable asymmetric elevation of right diaphragm and basilar subsegmental atelectasis.   MACRO: None   Signed by: Eligio Flores 12/24/2024 8:23 PM Dictation workstation:   [id number]    CT head wo IV contrast    Result Date: 12/24/2024  Interpreted By:  Eligio Flores, STUDY: CT HEAD WO IV CONTRAST;  12/24/2024 7:54 pm   INDICATION: Signs/Symptoms:fall at NH.   COMPARISON: 9/8/2023   ACCESSION NUMBER(S): [id number]   ORDERING CLINICIAN: MARTELL MCGRAW   TECHNIQUE: Contiguous axial images of the head were obtained without intravenous contrast.   FINDINGS: BRAIN PARENCHYMA:  There is cerebral atrophy and chronic periventricular white matter small vessel ischemic change. Stable right parietal encephalomalacia. The gray white matter differentiation is preserved.  No mass effect or midline shift.   HEMORRHAGE:  No evidence of acute intracranial hemorrhage. VENTRICLES AND EXTRA-AXIAL SPACES:  Stable mild ex vacuo dilatation of the right lateral ventricle atria. No evidence of abnormal extraaxial fluid collection. EXTRACRANIAL SOFT TISSUES:  Within normal limits. PARANASAL SINUSES/MASTOIDS:  The visualized paranasal sinuses and mastoid air cells are clear and well pneumatized. CALVARIUM:  No evidence of depressed calvarial fracture.   OTHER FINDINGS:  None       No evidence of acute intracranial hemorrhage or depressed calvarial fracture.   Cerebral atrophy and chronic ischemic change.       MACRO: None   Signed by: Eligio Flores 12/24/2024 8:03 PM Dictation workstation:   [id number]           Assessment/Plan   Assessment & Plan  Fall at nursing home, initial encounter    Vascular dementia with behavior disturbance    Acute metabolic encephalopathy    Closed 2-part intertrochanteric fracture of right femur with delayed healing    Schizoaffective disorder (Multi)    Acute kidney  injury (CMS-HCC)      -Acute metabolic encephalopathy likely worsened due to dehydration.  -Will need to obtain urinalysis possible UTI as well from CT scan findings  -Start patient on ceftriaxone pending UA  -Gentle IV fluid hydration for both dehydration as well as acute kidney injury  -Fall precautions.  -Orthopedic consult for right hip fracture  -Keep n.p.o. after midnight for possible intervention  -SCD for DVT prophylax  -Will consider one-to-one observation due to agitation.  -Resume home medication for schizoaffective and behavioral disorder including Zyprexa      Royal Hull MD

## 2024-12-25 NOTE — PROGRESS NOTES
Physical Therapy                 Therapy Communication Note    Patient Name: Willian Vela  MRN: [id number]  Department: [facility]  Room: [address] [place], [place] [address]-  Today's Date: 12/25/2024     Discipline: Physical Therapy    PT Missed Visit: Yes     Missed Visit Reason: 850-Missed Visit Reason: Other (Comment) (PT/OT hold pending ortho consult  R displaced trochanteric fx.)    Missed Time: Attempt    Comment:

## 2024-12-25 NOTE — PROGRESS NOTES
Occupational Therapy                 Therapy Communication Note    Patient Name: Willian Vela  MRN: [id number]  Department: [facility]  Room: [facility]/1022-A  Today's Date: 12/25/2024     Discipline: Occupational Therapy    OT Missed Visit: Yes     Missed Visit Reason: Missed Visit Reason: Patient placed on medical hold    Comment: Attempted OT evaluation. Patient pending Ortho consult (per note from Dr. Hull) for displaced right greater trochanter fracture. Will hold and re attempt as medically appropriate.

## 2024-12-26 ENCOUNTER — PHARMACY VISIT (OUTPATIENT)
Dept: PHARMACY | Facility: CLINIC | Age: 84
End: 2024-12-26
Payer: COMMERCIAL

## 2024-12-26 LAB
ANION GAP SERPL CALC-SCNC: 9 MMOL/L (ref 10–20)
BUN SERPL-MCNC: 19 MG/DL (ref 6–23)
CALCIUM SERPL-MCNC: 8.5 MG/DL (ref 8.6–10.3)
CHLORIDE SERPL-SCNC: 109 MMOL/L (ref 98–107)
CO2 SERPL-SCNC: 27 MMOL/L (ref 21–32)
CREAT SERPL-MCNC: 0.81 MG/DL (ref 0.5–1.3)
EGFRCR SERPLBLD CKD-EPI 2021: 86 ML/MIN/1.73M*2
ERYTHROCYTE [DISTWIDTH] IN BLOOD BY AUTOMATED COUNT: 13.5 % (ref 11.5–14.5)
GLUCOSE SERPL-MCNC: 88 MG/DL (ref 74–99)
HCT VFR BLD AUTO: 33.6 % (ref 41–52)
HGB BLD-MCNC: 11.3 G/DL (ref 13.5–17.5)
HOLD SPECIMEN: NORMAL
MAGNESIUM SERPL-MCNC: 1.54 MG/DL (ref 1.6–2.4)
MCH RBC QN AUTO: 31.3 PG (ref 26–34)
MCHC RBC AUTO-ENTMCNC: 33.6 G/DL (ref 32–36)
MCV RBC AUTO: 93 FL (ref 80–100)
NRBC BLD-RTO: 0 /100 WBCS (ref 0–0)
PLATELET # BLD AUTO: 197 X10*3/UL (ref 150–450)
POTASSIUM SERPL-SCNC: 3.7 MMOL/L (ref 3.5–5.3)
RBC # BLD AUTO: 3.61 X10*6/UL (ref 4.5–5.9)
SODIUM SERPL-SCNC: 141 MMOL/L (ref 136–145)
WBC # BLD AUTO: 4.6 X10*3/UL (ref 4.4–11.3)

## 2024-12-26 PROCEDURE — 99221 1ST HOSP IP/OBS SF/LOW 40: CPT | Performed by: STUDENT IN AN ORGANIZED HEALTH CARE EDUCATION/TRAINING PROGRAM

## 2024-12-26 PROCEDURE — RXMED WILLOW AMBULATORY MEDICATION CHARGE

## 2024-12-26 PROCEDURE — 99239 HOSP IP/OBS DSCHRG MGMT >30: CPT | Performed by: STUDENT IN AN ORGANIZED HEALTH CARE EDUCATION/TRAINING PROGRAM

## 2024-12-26 PROCEDURE — 2500000002 HC RX 250 W HCPCS SELF ADMINISTERED DRUGS (ALT 637 FOR MEDICARE OP, ALT 636 FOR OP/ED): Performed by: INTERNAL MEDICINE

## 2024-12-26 PROCEDURE — 2500000005 HC RX 250 GENERAL PHARMACY W/O HCPCS: Performed by: HOSPITALIST

## 2024-12-26 PROCEDURE — 83735 ASSAY OF MAGNESIUM: CPT | Performed by: HOSPITALIST

## 2024-12-26 PROCEDURE — 85027 COMPLETE CBC AUTOMATED: CPT | Performed by: HOSPITALIST

## 2024-12-26 PROCEDURE — 2500000004 HC RX 250 GENERAL PHARMACY W/ HCPCS (ALT 636 FOR OP/ED): Performed by: INTERNAL MEDICINE

## 2024-12-26 PROCEDURE — 97161 PT EVAL LOW COMPLEX 20 MIN: CPT | Mod: GP

## 2024-12-26 PROCEDURE — 2500000004 HC RX 250 GENERAL PHARMACY W/ HCPCS (ALT 636 FOR OP/ED): Performed by: STUDENT IN AN ORGANIZED HEALTH CARE EDUCATION/TRAINING PROGRAM

## 2024-12-26 PROCEDURE — 36415 COLL VENOUS BLD VENIPUNCTURE: CPT | Performed by: HOSPITALIST

## 2024-12-26 PROCEDURE — 97165 OT EVAL LOW COMPLEX 30 MIN: CPT | Mod: GO

## 2024-12-26 PROCEDURE — 2500000002 HC RX 250 W HCPCS SELF ADMINISTERED DRUGS (ALT 637 FOR MEDICARE OP, ALT 636 FOR OP/ED): Performed by: HOSPITALIST

## 2024-12-26 PROCEDURE — 2500000001 HC RX 250 WO HCPCS SELF ADMINISTERED DRUGS (ALT 637 FOR MEDICARE OP): Performed by: HOSPITALIST

## 2024-12-26 PROCEDURE — 1210000001 HC SEMI-PRIVATE ROOM DAILY

## 2024-12-26 PROCEDURE — 80048 BASIC METABOLIC PNL TOTAL CA: CPT | Performed by: HOSPITALIST

## 2024-12-26 PROCEDURE — 27246 TREAT THIGH FRACTURE: CPT | Performed by: STUDENT IN AN ORGANIZED HEALTH CARE EDUCATION/TRAINING PROGRAM

## 2024-12-26 PROCEDURE — 2500000001 HC RX 250 WO HCPCS SELF ADMINISTERED DRUGS (ALT 637 FOR MEDICARE OP): Performed by: INTERNAL MEDICINE

## 2024-12-26 RX ORDER — MAGNESIUM SULFATE HEPTAHYDRATE 40 MG/ML
2 INJECTION, SOLUTION INTRAVENOUS ONCE
Status: COMPLETED | OUTPATIENT
Start: 2024-12-26 | End: 2024-12-26

## 2024-12-26 RX ORDER — LANOLIN ALCOHOL/MO/W.PET/CERES
400 CREAM (GRAM) TOPICAL DAILY
Qty: 30 TABLET | Refills: 0 | Status: SHIPPED | OUTPATIENT
Start: 2024-12-26

## 2024-12-26 RX ORDER — SULFAMETHOXAZOLE AND TRIMETHOPRIM 800; 160 MG/1; MG/1
1 TABLET ORAL 2 TIMES DAILY
Qty: 10 TABLET | Refills: 0 | Status: SHIPPED | OUTPATIENT
Start: 2024-12-26 | End: 2024-12-31

## 2024-12-26 RX ADMIN — SERTRALINE HYDROCHLORIDE 100 MG: 50 TABLET, FILM COATED ORAL at 09:16

## 2024-12-26 RX ADMIN — ASPIRIN 81 MG: 81 TABLET, COATED ORAL at 09:16

## 2024-12-26 RX ADMIN — LAMOTRIGINE 400 MG: 100 TABLET ORAL at 09:16

## 2024-12-26 RX ADMIN — LACTULOSE 10 G: 20 SOLUTION ORAL at 09:15

## 2024-12-26 RX ADMIN — FOLIC ACID 1 MG: 1 TABLET ORAL at 09:16

## 2024-12-26 RX ADMIN — TRAZODONE HYDROCHLORIDE 100 MG: 50 TABLET ORAL at 20:44

## 2024-12-26 RX ADMIN — LAMOTRIGINE 400 MG: 100 TABLET ORAL at 20:44

## 2024-12-26 RX ADMIN — PHENYTOIN SODIUM 200 MG: 100 CAPSULE, EXTENDED RELEASE ORAL at 09:15

## 2024-12-26 RX ADMIN — CEFTRIAXONE SODIUM 1 G: 1 INJECTION, SOLUTION INTRAVENOUS at 02:29

## 2024-12-26 RX ADMIN — Medication 3 MG: at 20:44

## 2024-12-26 RX ADMIN — OLANZAPINE 20 MG: 5 TABLET, FILM COATED ORAL at 20:44

## 2024-12-26 RX ADMIN — MECLIZINE HCL 12.5 MG 12.5 MG: 12.5 TABLET ORAL at 20:44

## 2024-12-26 RX ADMIN — MECLIZINE HCL 12.5 MG 12.5 MG: 12.5 TABLET ORAL at 09:15

## 2024-12-26 RX ADMIN — Medication 100 MG: at 09:20

## 2024-12-26 RX ADMIN — FLUDROCORTISONE ACETATE 0.2 MG: 0.1 TABLET ORAL at 09:20

## 2024-12-26 RX ADMIN — MAGNESIUM SULFATE HEPTAHYDRATE 2 G: 40 INJECTION, SOLUTION INTRAVENOUS at 14:25

## 2024-12-26 RX ADMIN — PHENYTOIN SODIUM 200 MG: 100 CAPSULE, EXTENDED RELEASE ORAL at 20:44

## 2024-12-26 RX ADMIN — FINASTERIDE 5 MG: 5 TABLET, FILM COATED ORAL at 20:44

## 2024-12-26 ASSESSMENT — COGNITIVE AND FUNCTIONAL STATUS - GENERAL
PERSONAL GROOMING: A LITTLE
DAILY ACTIVITIY SCORE: 6
STANDING UP FROM CHAIR USING ARMS: A LOT
DRESSING REGULAR UPPER BODY CLOTHING: A LOT
EATING MEALS: A LITTLE
DAILY ACTIVITIY SCORE: 10
MOBILITY SCORE: 10
DRESSING REGULAR LOWER BODY CLOTHING: A LOT
HELP NEEDED FOR BATHING: TOTAL
MOVING FROM LYING ON BACK TO SITTING ON SIDE OF FLAT BED WITH BEDRAILS: A LOT
MOVING TO AND FROM BED TO CHAIR: A LOT
MOBILITY SCORE: 14
DRESSING REGULAR LOWER BODY CLOTHING: TOTAL
MOBILITY SCORE: 14
TOILETING: A LOT
TURNING FROM BACK TO SIDE WHILE IN FLAT BAD: A LOT
DRESSING REGULAR LOWER BODY CLOTHING: TOTAL
MOVING TO AND FROM BED TO CHAIR: A LOT
EATING MEALS: TOTAL
CLIMB 3 TO 5 STEPS WITH RAILING: TOTAL
PERSONAL GROOMING: A LOT
PERSONAL GROOMING: TOTAL
HELP NEEDED FOR BATHING: TOTAL
CLIMB 3 TO 5 STEPS WITH RAILING: TOTAL
DAILY ACTIVITIY SCORE: 13
DRESSING REGULAR UPPER BODY CLOTHING: TOTAL
STANDING UP FROM CHAIR USING ARMS: A LOT
WALKING IN HOSPITAL ROOM: TOTAL
MOVING TO AND FROM BED TO CHAIR: A LOT
STANDING UP FROM CHAIR USING ARMS: A LOT
WALKING IN HOSPITAL ROOM: TOTAL
WALKING IN HOSPITAL ROOM: TOTAL
HELP NEEDED FOR BATHING: A LOT
EATING MEALS: A LITTLE
CLIMB 3 TO 5 STEPS WITH RAILING: TOTAL
DRESSING REGULAR UPPER BODY CLOTHING: TOTAL
TOILETING: TOTAL
TOILETING: TOTAL

## 2024-12-26 ASSESSMENT — PAIN SCALES - GENERAL
PAINLEVEL_OUTOF10: 0 - NO PAIN
PAINLEVEL_OUTOF10: 0 - NO PAIN

## 2024-12-26 ASSESSMENT — PAIN - FUNCTIONAL ASSESSMENT
PAIN_FUNCTIONAL_ASSESSMENT: 0-10
PAIN_FUNCTIONAL_ASSESSMENT: 0-10

## 2024-12-26 ASSESSMENT — ACTIVITIES OF DAILY LIVING (ADL): BATHING_ASSISTANCE: MAXIMAL

## 2024-12-26 NOTE — CONSULTS
Orthopedics Consult Note  Patient: Willian Vela  Unit/Bed: 1022/1022-A  YOB: 1940  MRN: [id number]  Acct: [id number]   Admitting Diagnosis: Closed displaced fracture of greater trochanter of right femur, initial encounter [S72.111A]  Fall at nursing home, initial encounter [W19.XXXA, Y92.129]  Acute renal failure, unspecified acute renal failure type (CMS-[facility]) [N17.9]  Contusion of right hip, initial encounter [S70.01XA]  Date:  12/24/2024  Hospital Day: 2  Attending: Tom Villegas*         Complaint:  Chief Complaint   Patient presents with    Aggressive Behavior     EMS was called to SNF due to aggressive behavior pt has been having all day. Per nursing facility, pt slid out of wheelchair earlier today. Pt has hx of schizophrenia         History of Present Illness:  84-year-old male resides at a nursing home was agitated and confused slid from his wheelchair fell hitting his right hip.  After the fall he became more agitated.  History primarily collected via chart review given patient's history of dementia, history of schizophrenia, current altered mental status.  Pain primarily about the right hip.  Sharp worsened with movement relieved with rest.    Allergies:  Allergies   Allergen Reactions    Tamsulosin Unknown        PMHx:  Past Medical History:   Diagnosis Date    BPH (benign prostatic hyperplasia)     COPD (chronic obstructive pulmonary disease) (Multi)     Delusional disorder (Multi)     Dementia     Dry eye syndrome     Fracture of left clavicle     Generalized anxiety disorder     GERD (gastroesophageal reflux disease)     Hearing loss     Insomnia     Low back pain     Major depressive disorder     Mood disorder (CMS-[facility])     Orthostatic hypotension     Other seizures (Multi)     Paranoid schizophrenia (Multi)     Reduced mobility     Venous (peripheral) insufficiency     Vitamin D deficiency        PSHx:  History reviewed. No pertinent surgical history.    Social  Hx:  Social History     Socioeconomic History    Marital status:    Tobacco Use    Smoking status: Unknown     Social Drivers of Health     Financial Resource Strain: Patient Unable To Answer (12/25/2024)    Overall Financial Resource Strain (CARDIA)     Difficulty of Paying Living Expenses: Patient unable to answer   Food Insecurity: No Food Insecurity (12/25/2024)    Hunger Vital Sign     Worried About Running Out of Food in the Last Year: Never true     Ran Out of Food in the Last Year: Never true   Transportation Needs: Patient Unable To Answer (12/25/2024)    PRAPARE - Transportation     Lack of Transportation (Medical): Patient unable to answer     Lack of Transportation (Non-Medical): Patient unable to answer   Intimate Partner Violence: Patient Unable To Answer (12/25/2024)    Humiliation, Afraid, Rape, and Kick questionnaire     Fear of Current or Ex-Partner: Patient unable to answer     Emotionally Abused: Patient unable to answer     Physically Abused: Patient unable to answer     Sexually Abused: Patient unable to answer   Housing Stability: Patient Unable To Answer (12/25/2024)    Housing Stability Vital Sign     Unable to Pay for Housing in the Last Year: Patient unable to answer     Number of Times Moved in the Last Year: 0     Homeless in the Last Year: Patient unable to answer       Family Hx:  No family history on file.    Review of Systems:   Review of Systems      Physical Examination:    Visit Vitals  /58   Pulse 58   Temp 36.9 °C (98.4 °F)   Resp 16      Physical Exam  General: Alert and oriented x 0, disheveled  Focused Exam of right hip: Tenderness palpation lateral hip.  Deferred range of motion testing secondary to known fracture.  Nontender to palpation about the distal femur, knee, leg foot and ankle.  Compartments soft and compressible patient not following commands unable to assess motor function.  Overlying skin is intact  Other extremities nontender with gentle  palpation.    LABS:  LABS:  CBC:   Results from last 7 days   Lab Units 12/26/24  0450 12/25/24  0540 12/24/24 2004   WBC AUTO x10*3/uL 4.6 5.1 6.0   RBC AUTO x10*6/uL 3.61* 3.79* 4.18*   HEMOGLOBIN g/dL 11.3* 11.8* 12.8*   HEMATOCRIT % 33.6* 35.5* 39.0*   MCV fL 93 94 93   MCH pg 31.3 31.1 30.6   MCHC g/dL 33.6 33.2 32.8   RDW % 13.5 13.8 13.7   PLATELETS AUTO x10*3/uL 197 210 237     CMP:    Results from last 7 days   Lab Units 12/26/24 0450 12/25/24  0540 12/24/24 2004   SODIUM mmol/L 141 144 143   POTASSIUM mmol/L 3.7 3.4* 3.4*   CHLORIDE mmol/L 109* 109* 109*   CO2 mmol/L 27 28 27   BUN mg/dL 19 32* 35*   CREATININE mg/dL 0.81 1.12 1.54*   GLUCOSE mg/dL 88 87 92   PROTEIN TOTAL g/dL  --   --  6.9   CALCIUM mg/dL 8.5* 8.5* 9.2   BILIRUBIN TOTAL mg/dL  --   --  0.3   ALK PHOS U/L  --   --  139*   AST U/L  --   --  14   ALT U/L  --   --  7*       BMP:    Results from last 7 days   Lab Units 12/26/24 0450 12/25/24  0540 12/24/24 2004   SODIUM mmol/L 141 144 143   POTASSIUM mmol/L 3.7 3.4* 3.4*   CHLORIDE mmol/L 109* 109* 109*   CO2 mmol/L 27 28 27   BUN mg/dL 19 32* 35*   CREATININE mg/dL 0.81 1.12 1.54*   CALCIUM mg/dL 8.5* 8.5* 9.2   GLUCOSE mg/dL 88 87 92     Magnesium:  Results from last 7 days   Lab Units 12/26/24 0450 12/25/24 0540   MAGNESIUM mg/dL 1.54* 1.64     Troponin:      BNP:     Lipid Panel:       Current Medications:    Current Facility-Administered Medications:     acetaminophen (Tylenol) tablet 650 mg, 650 mg, oral, q4h PRN **OR** acetaminophen (Tylenol) oral liquid 650 mg, 650 mg, nasogastric tube, q4h PRN **OR** acetaminophen (Tylenol) suppository 650 mg, 650 mg, rectal, q4h PRN, Tom Matthews MD    aspirin EC tablet 81 mg, 81 mg, oral, Daily, Royal Hull MD, 81 mg at 12/25/24 0900    cefTRIAXone (Rocephin) 1 g in dextrose (iso) IV 50 mL, 1 g, intravenous, q24h, Royal Hull MD, Stopped at 12/26/24 0232    finasteride (Proscar) tablet 5 mg, 5 mg, oral, Nightly,  Tom Matthews MD, 5 mg at 12/25/24 2026    fludrocortisone (Florinef) tablet 0.2 mg, 0.2 mg, oral, Daily, Tom Matthews MD    folic acid (Folvite) tablet 1 mg, 1 mg, oral, Daily, Royal Hull MD, 1 mg at 12/25/24 0901    lactulose 20 gram/30 mL oral solution 10 g, 10 g, oral, Daily, Tom Matthews MD    lamoTRIgine (LaMICtal) tablet 400 mg, 400 mg, oral, BID, Royal Hull MD, 400 mg at 12/25/24 2026    LORazepam (Ativan) tablet 0.5 mg, 0.5 mg, oral, q6h PRN, Royal Hull MD, 0.5 mg at 12/25/24 0901    meclizine (Antivert) tablet 12.5 mg, 12.5 mg, oral, BID, Tom Matthews MD, 12.5 mg at 12/25/24 2026    melatonin tablet 3 mg, 3 mg, oral, Nightly PRN, Tom Matthews MD    OLANZapine (ZyPREXA) injection 5 mg, 5 mg, intramuscular, q6h PRN, Royal Hull MD, 5 mg at 12/25/24 1254    OLANZapine (ZyPREXA) tablet 20 mg, 20 mg, oral, Nightly, Royal Hull MD, 20 mg at 12/25/24 2026    ondansetron (Zofran) tablet 4 mg, 4 mg, oral, q8h PRN **OR** ondansetron (Zofran) injection 4 mg, 4 mg, intravenous, q8h PRN, Tom Matthews MD    phenytoin ER (Dilantin) capsule 200 mg, 200 mg, oral, BID, Royal Hull MD, 200 mg at 12/25/24 2026    sertraline (Zoloft) tablet 100 mg, 100 mg, oral, Daily, Royal Hull MD, 100 mg at 12/25/24 0900    thiamine (Vitamin B-1) tablet 100 mg, 100 mg, oral, Daily, Royal Hull MD, 100 mg at 12/25/24 0900    traZODone (Desyrel) tablet 100 mg, 100 mg, oral, Nightly, Tom Matthews MD, 100 mg at 12/25/24 2026    CT pelvis wo IV contrast    Result Date: 12/24/2024  Interpreted By:  Eligio Flores, STUDY: CT PELVIS WO IV CONTRAST;  12/24/2024 9:21 pm   INDICATION: Signs/Symptoms:Fall at nursing home concern for occult fracture.     COMPARISON: None.   ACCESSION NUMBER(S): [id number]   ORDERING CLINICIAN: MARTELL MCGRAW   TECHNIQUE: Contiguous axial images of the pelvis were obtained without  intravenous contrast. Coronal and sagittal reformatted images were obtained from the axial images.   FINDINGS: There is displaced fracture of the right greater trochanter. There is a osteopenia. There is bilateral hip osteoarthrosis. Degenerative change of the lower lumbar spine.   Limited evaluation the soft tissues of the pelvis. There is evidence of urinary bladder wall thickening and trabeculation.       Displaced right greater trochanter fracture.   Urinary bladder wall thickening and trabeculation.   MACRO: None   Signed by: Eligio Flores 12/24/2024 9:57 PM Dictation workstation:   [id number]    XR pelvis 1-2 views    Result Date: 12/24/2024  Interpreted By:  Eligio Flores, STUDY: XR PELVIS 1-2 VIEWS; ;  12/24/2024 8:07 pm   INDICATION: Signs/Symptoms:fall at NH.     COMPARISON: 6/29/2023   ACCESSION NUMBER(S): [id number]   ORDERING CLINICIAN: MARTELL MCGRAW   FINDINGS: No definite evidence of acute displaced pelvic fracture. There is osteopenia. Mild bilateral hip osteoarthrosis.       No definite evidence of acute displaced pelvic fracture.   Osteopenia.   If the patient is acutely unable to bear weight or pain increases, cross-sectional imaging is recommended to exclude radiographically occult fracture.     MACRO: None   Signed by: Eligio Flores 12/24/2024 8:23 PM Dictation workstation:   [id number]                         Assessment: 84-year-old male with right displaced greater trochanter fracture  Patient Active Problem List   Diagnosis    Fracture of clavicle    Orthostatic hypotension    Urinary tract infection    Vascular dementia with behavior disturbance    Sepsis (Multi)    Acute metabolic encephalopathy    Elevated troponin    Fall at nursing home, initial encounter    Closed 2-part intertrochanteric fracture of right femur with delayed healing    Schizoaffective disorder (Multi)    Acute kidney injury (CMS-HCC)          Plan:  X-rays and CT reviewed consistent with displaced right greater  trochanter fracture.  Recommend that we obtain a stat MRI of the right hip to rule out intertrochanteric extension  Recommend bedrest  Nonweightbearing right lower extremity  Pain control per primary, will update plan after MRI is obtained  Plan for MRI today right hip without contrast  Operative and nonoperative treatment modalities considered we will obtain MRI prior to definitively deciding this    Plan update/ addendum: Initial plan was to proceed with an MRI of the right hip to evaluate for possible intertrochanteric extension of greater trochanter avulsion type fracture.  After further discussion with nursing as well as MRI staff they feel uncomfortable proceeding with MRI for patient's safety at this point patient or family unable to complete MRI safety assessment.  Given this situation is not ideal however fracture pattern does appear to be stable on x-rays and CT will proceed with conservative treatment.  I do not appreciate entering intertrochanter extension on x-rays or CT therefore do recommend proceeding with weight-bear as tolerated no hip abduction exercises.  Will see how patient does with mobilization.  Recommend PT OT.  Patient progresses appropriately with physical therapy recommend follow-up in 2 weeks.  X-rays at follow-up 2 views right hip           Electronically signed by Man Nguyễn MD on 12/26/2024 at 6:52 AM

## 2024-12-26 NOTE — DISCHARGE INSTRUCTIONS
Recommend bedrest  Weight bearing as tolerated to right lower extremity with no hip abduction  Outpatient fu with orthopedic surgery

## 2024-12-26 NOTE — PROGRESS NOTES
Occupational Therapy    Evaluation    Patient Name: Willian Vela  MRN: [id number]  Department: [facility]  Room: [address] [place] [address]  Today's Date: 12/26/2024  Time Calculation  Start Time: 1049  Stop Time: 1058  Time Calculation (min): 9 min        Assessment:  OT Assessment: Pt. LTC/dementia unit at baseline; requires assist for all ADLs/IADLs at baseline. Performs pivot transfers at baseline; would benefit from continued OT to address ADL participation and functional transfers  Prognosis: Fair  End of Session Communication: Bedside nurse  End of Session Patient Position: Bed, 3 rail up, Alarm on (1:1 sitter present)  OT Assessment Results: Decreased ADL status  Prognosis: Fair  Plan:  Treatment Interventions: ADL retraining, Functional transfer training  OT Frequency: 2 times per week  OT Discharge Recommendations: 24 hr supervision due to cognition  OT - OK to Discharge: Yes (when medically stable/cleared)    Subjective   Current Problem:  1. Fall at nursing home, initial encounter        2. Closed displaced fracture of greater trochanter of right femur, initial encounter        3. Contusion of right hip, initial encounter        4. Acute renal failure, unspecified acute renal failure type (CMS-HCC)          General:  General  Reason for Referral: ADL impairment  Referred By: OT/PT Cassie 23/24  Past Medical History Relevant to Rehab: COPD,depression,anxiety, Dementia, BPH, seizures schizoaffective,ОЛЕГ. aggressive behaviors , confusion.  Family/Caregiver Present: Yes  Caregiver Feedback: 1:1 sitter present  Co-Treatment: PT  Co-Treatment Reason: to maximize pt. safety and participation.  Prior to Session Communication: Bedside nurse  Patient Position Received: Bed, 3 rail up, Alarm on  General Comment: Pt. is 85 y/o male to ED 12/24 s/p fall out of wheelchair at nursing home and increased agressive behaviors. CT pelvis: R greater trochanter fx. ortho consulted; ordered MRI, however pt. was unable to complete MRI  screening. Unable to perform at this time. CT head (-), CXR: bibasilar atelectasis  Precautions:  LE Weight Bearing Status: Weight Bearing as Tolerated  Medical Precautions: Fall precautions  Precautions Comment: Per orthopedics, WBAT and no R hip abduction    Pain:  Pain Assessment  Pain Assessment: 0-10  0-10 (Numeric) Pain Score: 0 - No pain (no sign of pain during mobility.)    Objective   Cognition:  Overall Cognitive Status: Impaired at baseline (Delayed initiation and requires tactile cueing to begin)  Orientation Level: Disoriented to place, Disoriented to time, Disoriented to situation  Safety/Judgement: Exceptions to WFL  Insight: Severe  Impulsive: Mildly  Processing Speed: Delayed    Home Living:  Home Living Comments: Pt. is LTC resident at [facility]. Dementia unit.  Prior Function:  Prior Function Comments: Per EMR pt. requires assist for all ADLs/IADLs. Per last evaluation, pt. requires assistance for transfers to w/c.    ADL:  Eating Assistance: Minimal  Grooming Assistance: Moderate  Bathing Assistance: Maximal  UE Dressing Assistance: Moderate  LE Dressing Assistance: Maximal  Toileting Assistance with Device: Maximal  Activity Tolerance:  Endurance: Decreased tolerance for upright activites  Activity Tolerance Comments: Pt. states that he just wants to sleep  Bed Mobility/Transfers: Bed Mobility  Bed Mobility: Yes  Bed Mobility 1  Bed Mobility 1: Supine to sitting  Level of Assistance 1: Maximum assistance  Bed Mobility Comments 1: Max A x2 to bring LEs to edge of bed and lift trunk to upright sit ; pt. does not initiate with verbal cues only  Bed Mobility 2  Bed Mobility  2: Sitting to supine  Level of Assistance 2: Maximum assistance  Bed Mobility Comments 2: x2 to bring LEs and trunk back into bed. max a to reposition in bed    Transfers  Transfer: Yes  Transfer 1  Technique 1: Sit to stand  Transfer Device 1: Walker  Transfer Level of Assistance 1: Moderate assistance  Trials/Comments 1:  x2 assist to boost, lift, and steady over walker    Functional Mobility:  Functional Mobility  Functional Mobility Performed: No  Sitting Balance:  Static Sitting Balance  Static Sitting-Level of Assistance: Minimum assistance  Standing Balance:  Dynamic Standing Balance  Dynamic Standing-Comments: poor -     Strength:  Strength Comments: unable to formally assess    Coordination:  Movements are Fluid and Coordinated: No     Extremities: RUE   RUE :  (Did not formally assess, however demo WFL during functional  movements)     Outcome Measures:[facility] Daily Activity  Putting on and taking off regular lower body clothing: A lot  Bathing (including washing, rinsing, drying): A lot  Putting on and taking off regular upper body clothing: A lot  Toileting, which includes using toilet, bedpan or urinal: A lot  Taking care of personal grooming such as brushing teeth: A lot  Eating Meals: A little  Daily Activity - Total Score: 13    Education Documentation  ADL Training, taught by Mae Holt OT at 12/26/2024  3:08 PM.  Learner: Patient  Readiness: Nonacceptance  Method: Explanation  Response: Needs Reinforcement      IP EDUCATION:  Education  Individual(s) Educated: Patient  Education Provided: Fall precautons, Risk and benefits of OT discussed with patient or other, POC discussed and agreed upon    Goals:  Encounter Problems       Encounter Problems (Active)       OT Goals       Pt. will complete grooming tasks with Min A (Progressing)       Start:  12/26/24    Expected End:  01/09/25            Pt. will demonstrate Min A for all functional transfers  (Progressing)       Start:  12/26/24    Expected End:  01/09/25            Pt. will demonstrate Fair sitting EOB x3 minutes for ADL participation (Progressing)       Start:  12/26/24    Expected End:  01/09/25

## 2024-12-26 NOTE — PROGRESS NOTES
Patient had fall resulting in displace right greater trochanter fracture. Patient to have MRI today, ortho will determine their plan of care based on MRI results. CT team will continue to follow, patient is LTC at [place], can return once medically cleared.

## 2024-12-26 NOTE — PROGRESS NOTES
MRI not able to be obtained due to patient is not able to provide answers for MRI screening sheet due to mentation with history of dementia. He has one living family  member, a step daughter who does not know his medical history. MRI department states due to safety of patient will not be able to proceed with MRI. Surgeon updated on this.     Will plan for patient to be WBAT to right lower extremity with no hip abduction. Will follow closely to see how he progresses with PT/OT.     Patient may eat.

## 2024-12-26 NOTE — PROGRESS NOTES
Physical Therapy    Physical Therapy Evaluation    Patient Name: Willian Vela  MRN: [id number]  Today's Date: 12/26/2024   Time Calculation  Start Time: 1048  Stop Time: 1058  Time Calculation (min): 10 min  1022/1022-A    Assessment/Plan   PT Assessment  PT Assessment Results: Decreased strength, Decreased range of motion, Decreased endurance, Impaired balance, Decreased mobility, Decreased coordination, Decreased cognition, Impaired judgement, Decreased safety awareness, Orthopedic restrictions  Rehab Prognosis: Fair  Evaluation/Treatment Tolerance: Patient limited by fatigue, Patient limited by pain  Barriers to Participation: Comorbidities  End of Session Communication: Bedside nurse, Care Coordinator  Assessment Comment: pt with decreased mobility /gait strength balance endurance pt to benefit from skilled PT to address deficits and improve functional mobility .  End of Session Patient Position: Bed, 3 rail up, Alarm on (1 on1 sitter)  IP OR SWING BED PT PLAN  Inpatient or Swing Bed: Inpatient  PT Plan  Treatment/Interventions: Bed mobility, Transfer training, Balance training, Strengthening, Endurance training, Therapeutic exercise, Therapeutic activity, Positioning, Gait training  PT Plan: Ongoing PT  PT Frequency: 2 times per week  PT Discharge Recommendations: Moderate intensity level of continued care (mod vs return to LTC)  PT Recommended Transfer Status: Assist x2, Assistive device  PT - OK to Discharge: Yes (once medically ready for discharge to next level of care.)    Subjective     Current Problem:  1. Fall at nursing home, initial encounter        2. Closed displaced fracture of greater trochanter of right femur, initial encounter        3. Contusion of right hip, initial encounter        4. Acute renal failure, unspecified acute renal failure type (CMS-[facility])          Patient Active Problem List   Diagnosis    Fracture of clavicle    Orthostatic hypotension    Urinary tract infection    Vascular  dementia with behavior disturbance    Sepsis (Multi)    Acute metabolic encephalopathy    Elevated troponin    Fall at nursing home, initial encounter    Closed 2-part intertrochanteric fracture of right femur with delayed healing    Schizoaffective disorder (Multi)    Acute kidney injury (CMS-HCC)       General Visit Information:  General  Reason for Referral: weakness/ impaired mobility  Referred By: OT/PT Cassie 23/24  Past Medical History Relevant to Rehab: COPD,depression,anxiety, Dementia, BPH, seizures schizoaffective,ОЛЕГ. aggressive behaviors , confusion.  Co-Treatment: OT  Co-Treatment Reason: to maximize pt safety  Prior to Session Communication: Bedside nurse  Patient Position Received: Bed, 4 rail up, Alarm on (1 on1)  General Comment: pt is an 83 yo male that came to the hospital on 12/24 with reports of fall at LTC with increased aggressive behaviors.  test/labs :  UTI +, CT head neg, CXR : basilar atelectasis,  Xray pelvis :  neg, CT pelvis: displaced  R greater throchanteric fx.  MRI : unable to complete due to limited medical hx.   pt with ortho consult : non sx at this time.  WBAT with no hip abduction on R LE.  Home Living:  Home Living  Home Living Comments: pt is a LTC  resident.  Prior Level of Function:  Prior Function Per Pt/Caregiver Report  Prior Function Comments: per EMR  pt had total assistance with adls and iadls.  min A for transfers to wheel chair.  pt non ambulatory . multiple falls.  Precautions:  Precautions  LE Weight Bearing Status: Weight Bearing as Tolerated (no hip ABD)  Medical Precautions: Fall precautions, Seizure precautions  Precautions Comment: no R hip abduction.  Objective   Pain:  Pain Assessment  Pain Assessment: 0-10  0-10 (Numeric) Pain Score:  (unable to rate)  Cognition:  Cognition  Overall Cognitive Status: Impaired at baseline (requires extensive cues for all activity)  Orientation Level: Disoriented to situation, Disoriented to time, Disoriented to  place  Memory:  (poor)  Processing Speed: Delayed  General Assessments:  Activity Tolerance  Endurance: Decreased tolerance for upright activites  Sensation  Sensation Comment: grossly intact  Strength  Strength Comments: unable to MMT due to cogntion. grossly 3+/5 BLEs     Coordination  Movements are Fluid and Coordinated: No     Static Sitting Balance  Static Sitting-Comment/Number of Minutes: fair -  Dynamic Sitting Balance  Dynamic Sitting-Comments: poor +  Static Standing Balance  Static Standing-Comment/Number of Minutes: poor  Dynamic Standing Balance  Dynamic Standing-Comments: poor  Functional Assessments:  Bed Mobility  Bed Mobility: Yes  Bed Mobility 1  Bed Mobility 1: Supine to sitting, Sitting to supine  Level of Assistance 1: Maximum assistance, +2, Maximum verbal cues  Bed Mobility Comments 1: max A x 2 to EOB .   once EOB mod A to sit  Transfers  Transfer: Yes  Transfer 1  Technique 1: Sit to stand, Stand to sit  Transfer Device 1: Walker (FWW)  Transfer Level of Assistance 1: Moderate assistance, +2, Maximum verbal cues  Trials/Comments 1: mod A x 2 with FWW max cues to stand up . cues to push up from bed but pt cont to pull from walker.  Ambulation/Gait Training  Ambulation/Gait Training Performed: No (attempt for side step with FWW while standing but once walker was moved pt sat back down onto the bed needing mod A x 2 to sit down .)  Extremity/Trunk Assessments:  RLE   RLE : Within Functional Limits  LLE   LLE : Within Functional Limits  Outcome Measures:  [facility] Basic Mobility  Turning from your back to your side while in a flat bed without using bedrails: A lot  Moving from lying on your back to sitting on the side of a flat bed without using bedrails: A lot  Moving to and from bed to chair (including a wheelchair): A lot  Standing up from a chair using your arms (e.g. wheelchair or bedside chair): A lot  To walk in hospital room: Total  Climbing 3-5 steps with railing: Total  Basic Mobility -  Total Score: 10  Goals:  Encounter Problems       Encounter Problems (Active)       PT Problem       Pt will demonstrate min A x 1  with bed mobility to edge of bed.         Start:  12/26/24    Expected End:  01/09/25            Pt will demonstrate min A x 1 with sit to stand/chair transfers with FWW.         Start:  12/26/24    Expected End:  01/09/25            Pt to demo improved BLE strength by being able to complete supine/seated thera ex 2x20 BLEs with 4 or less rest breaks .         Start:  12/26/24    Expected End:  01/09/25                 Education Documentation  Mobility Training, taught by Dale Valdivia, PT at 12/26/2024  1:03 PM.  Learner: Patient  Readiness: Acceptance  Method: Explanation  Response: Verbalizes Understanding    Education Comments  No comments found.

## 2024-12-26 NOTE — CONSULTS
"Nutrition Initial Assessment:   Nutrition Assessment    Reason for Assessment: Admission nursing screening    Patient is a 84 y.o. male presenting with aggressive behavior from nursing home. Consulted for weight loss. Weight loss of -8.29% in 5 months. (181#s 7/1-166#s 12/24). No concerns with appetite changes. No nutritional concerns at this time.     Nutrition History:  Energy Intake: Fair 50-75 %  Food and Nutrient History: Eating fair, no changes in appetite reported. Patient resides in nursing home. Attempted to call RD from nursing home to see eating pattern. Continue to encourage PO intake.  Food Allergies/Intolerances:  None  GI Symptoms: None  Oral Problems: None     Anthropometrics:  Height: 182.9 cm (6' 0.01\")   Weight: 75.3 kg (166 lb 0.1 oz)   BMI (Calculated): 22.51    Weight Change %:  Weight History / % Weight Change: -8.29% in 5 month. Not significiant weight loss.  Interpretation of Weight Loss: 10% in 6 months    Nutrition Focused Physical Exam Findings:  Subcutaneous Fat Loss:   Orbital Fat Pads: Mild-Moderate (slight dark circles and slight hollowing)  Buccal Fat Pads: Mild-Moderate (flat cheeks, minimal bounce)  Muscle Wasting:  Temporalis: Mild-Moderate (slight depression)  Pectoralis (Clavicular Region): Mild-Moderate (some protrusion of clavicle)  Edema:  Edema: none  Physical Findings:  Hair: Negative  Eyes: Negative  Mouth: Negative    Nutrition Significant Labs:  CBC Trend:   Results from last 7 days   Lab Units 12/26/24  0450 12/25/24  0540 12/24/24 2004   WBC AUTO x10*3/uL 4.6 5.1 6.0   RBC AUTO x10*6/uL 3.61* 3.79* 4.18*   HEMOGLOBIN g/dL 11.3* 11.8* 12.8*   HEMATOCRIT % 33.6* 35.5* 39.0*   MCV fL 93 94 93   PLATELETS AUTO x10*3/uL 197 210 237    , BMP Trend:   Results from last 7 days   Lab Units 12/26/24  0450 12/25/24  0540 12/24/24 2004   GLUCOSE mg/dL 88 87 92   CALCIUM mg/dL 8.5* 8.5* 9.2   SODIUM mmol/L 141 144 143   POTASSIUM mmol/L 3.7 3.4* 3.4*   CO2 mmol/L 27 28 27 "   CHLORIDE mmol/L 109* 109* 109*   BUN mg/dL 19 32* 35*   CREATININE mg/dL 0.81 1.12 1.54*     Nutrition Specific Medications:  aspirin, 81 mg, oral, Daily  cefTRIAXone, 1 g, intravenous, q24h  finasteride, 5 mg, oral, Nightly  fludrocortisone, 0.2 mg, oral, Daily  folic acid, 1 mg, oral, Daily  lactulose, 10 g, oral, Daily  lamoTRIgine, 400 mg, oral, BID  magnesium sulfate, 2 g, intravenous, Once  meclizine, 12.5 mg, oral, BID  OLANZapine, 20 mg, oral, Nightly  phenytoin ER, 200 mg, oral, BID  sertraline, 100 mg, oral, Daily  thiamine, 100 mg, oral, Daily  traZODone, 100 mg, oral, Nightly       I/O:   Last BM Date: 12/25/24; Stool Appearance: Soft (12/25/24 1017)    Dietary Orders (From admission, onward)       Start     Ordered    12/26/24 0922  Adult diet Regular  Diet effective now        Question:  Diet type  Answer:  Regular    12/26/24 0921    12/24/24 2358  May Participate in Room Service With Assistance  ( ROOM SERVICE MAY PARTICIPATE WITH ASSISTANCE)  Once        Question:  .  Answer:  Yes    12/24/24 2357                  Estimated Needs:   Total Energy Estimated Needs (kCal): 1875 kCal  Method for Estimating Needs: 25kcals/kg BW  Total Protein Estimated Needs (g): 75 g  Method for Estimating Needs: 1g/kg IBW  Total Fluid Estimated Needs (mL): 1875 mL  Method for Estimating Needs: 1ml/kcal or per MD        Nutrition Diagnosis        Nutrition Diagnosis  Patient has Nutrition Diagnosis: Yes  Diagnosis Status (1): New  Nutrition Diagnosis 1: Inadequate energy intake  Related to (1): weight loss  As Evidenced by (1): weight loss of -8.29% in 5 months.       Nutrition Interventions/Recommendations         Nutrition Prescription:  Individualized Nutrition Prescription Provided for : Individualized nutrition prescription of 1875 kcals and 75g of protein to be provided with diet order. Continue with regular diet.        Nutrition Interventions:   Interventions: Meals and snacks  Meals and Snacks:  (Continue  with regular diet.)  Goal: >50% PO of all meals.    Nutrition Monitoring and Evaluation   Food/Nutrient Related History Monitoring  Monitoring and Evaluation Plan: Energy intake  Energy Intake: Estimated energy intake  Criteria: Continue to monitor PO intakes, goal of >50% of all meals.    Body Composition/Growth/Weight History  Monitoring and Evaluation Plan: Weight  Weight: Weight change  Criteria: Continue to monitor wt status and wt changes.    Biochemical Data, Medical Tests and Procedures  Monitoring and Evaluation Plan: Electrolyte/renal panel, Glucose/endocrine profile    Time Spent (min): 45 minutes

## 2024-12-26 NOTE — PROGRESS NOTES
Pt. Admitted from [facility].  Per [facility] he is a long term care resident there/bedhold, can return when ready.  Referral sent to [facility] for return to [facility]/long term care

## 2024-12-26 NOTE — DISCHARGE SUMMARY
Discharge Diagnosis  Fall at nursing home, Rt hip fracture   UTI     Issues Requiring Follow-Up  Outpatient fu with orthopedic surgery for Rt hip fracture   Outpatient follow-up with PCP as needed    Discharge Meds     Medication List      START taking these medications     magnesium oxide 400 mg tablet; Commonly known as: Mag-Ox; Take 1 tablet   (400 mg) by mouth once daily.   sulfamethoxazole-trimethoprim 800-160 mg tablet; Commonly known as:   Bactrim DS; Take 1 tablet by mouth 2 times a day for 5 days.     CONTINUE taking these medications     acetaminophen 325 mg tablet; Commonly known as: Tylenol   aspirin 81 mg EC tablet   cholecalciferol 50 MCG (2000 UT) tablet; Commonly known as: Vitamin D-3   CRAN[facility]Y URINARY TRACT HEALTH ORAL   famotidine 20 mg tablet; Commonly known as: Pepcid   finasteride 5 mg tablet; Commonly known as: Proscar   fludrocortisone 0.1 mg tablet; Commonly known as: Florinef   folic acid 1 mg tablet; Commonly known as: Folvite   lactulose 20 gram/30 mL oral solution   * lamoTRIgine 200 mg tablet; Commonly known as: LaMICtal   * lamoTRIgine 200 mg tablet; Commonly known as: LaMICtal   loperamide 2 mg tablet; Commonly known as: Imodium A-D   LORazepam 0.5 mg tablet; Commonly known as: Ativan   meclizine 25 mg tablet; Commonly known as: Antivert   melatonin 10 mg tablet   nystatin 100,000 unit/gram powder; Commonly known as: Mycostatin   OLANZapine 5 mg tablet; Commonly known as: ZyPREXA   ondansetron 4 mg tablet; Commonly known as: Zofran   oxybutynin XL 5 mg 24 hr tablet; Commonly known as: Ditropan-XL   phenytoin  mg capsule; Commonly known as: Dilantin   polyethylene glycol 17 gram packet; Commonly known as: Glycolax, Miralax   Rexulti 0.5 mg tablet; Generic drug: brexpiprazole   sennosides-docusate sodium 8.6-50 mg tablet; Commonly known as:   Latasha-Colace   sertraline 100 mg tablet; Commonly known as: Zoloft   thiamine 100 mg tablet; Commonly known as: Vitamin B-1   traZODone 100  mg tablet; Commonly known as: Desyrel  * This list has 2 medication(s) that are the same as other medications   prescribed for you. Read the directions carefully, and ask your doctor or   other care provider to review them with you.       Test Results Pending At Discharge  Pending Labs       Order Current Status    Urine Culture Preliminary result            Hospital Course  Mechanical fall at nursing home  Right hip fracture  -He was brought in to the emergency room after a fall.  -His CT of his pelvis demonstrated a displaced right greater trochanteric fracture.  Plan:  -Seen by orthopedic surgery, MRI of pelvis ordered however due to patient mental status it was canceled, orthopedic surgery recommended nonoperative management with WBAT to right lower extremity with no hip abduction.   -Patient will follow as outpatient with orthopedic surgery office     Acute metabolic encephalopathy secondary to UTI vs superimposed on dementia  Urinary tract infection  History of vascular dementia  History of schizoaffective disorder  -Was started on ceftriaxone for UTI and was switched to Bactrim on discharge.  -Continued Zyprexa, Ativan, Dilantin, Zoloft, trazodone, Lamictal     Acute kidney injury resolved with IV fluid hydration  -Most likely prerenal etiology from dehydration     History of orthostatic hypotension  -Continued Florinef 0.2 mg daily      Currently patient is hemodynamically stable and ready for discharge back to long-term care.  He will be continued on oral Bactrim and rest of his home medication.  He will be continued on WBAT per orthopedic surgery recommendation and outpatient follow-up with primary care and orthopedic surgery office for further recommendation and medication adjustment.  Magnesium was replaced during this hospital admission and will be continued on discharge.    Pertinent Physical Exam At Time of Discharge  Physical Exam  GENERAL: Laying in bed, does not appear to be in any distress.    HEENT: HEAD: Normocephalic atraumatic.  Neck: Supple.  Eyes: Pupils are reactive to direct light.   CVS: S1, S2 heard. Regular rate and rhythm  LUNGS: Clear to auscultate bilaterally. No wheezing or rhonchi appreciated.  ABDOMEN: Soft, nontender to palpate. Positive bowel sounds. No guarding or rebound appreciated.  NEUROLOGICAL: No focal neurological deficits appreciated. Cranial nerves are grossly intact.  EXTREMITIES: Right hip is externally rotated.  SKIN:  Grossly intact, warm and dry.        Time spent 45 minutes  Dian Clark MD

## 2024-12-27 VITALS
HEIGHT: 72 IN | OXYGEN SATURATION: 95 % | HEART RATE: 57 BPM | TEMPERATURE: 98.4 F | SYSTOLIC BLOOD PRESSURE: 118 MMHG | BODY MASS INDEX: 22.48 KG/M2 | DIASTOLIC BLOOD PRESSURE: 54 MMHG | WEIGHT: 166.01 LBS | RESPIRATION RATE: 18 BRPM

## 2024-12-27 LAB — BACTERIA UR CULT: ABNORMAL

## 2024-12-27 PROCEDURE — 2500000004 HC RX 250 GENERAL PHARMACY W/ HCPCS (ALT 636 FOR OP/ED): Performed by: INTERNAL MEDICINE

## 2024-12-27 PROCEDURE — 2500000001 HC RX 250 WO HCPCS SELF ADMINISTERED DRUGS (ALT 637 FOR MEDICARE OP): Performed by: HOSPITALIST

## 2024-12-27 PROCEDURE — 2500000001 HC RX 250 WO HCPCS SELF ADMINISTERED DRUGS (ALT 637 FOR MEDICARE OP): Performed by: INTERNAL MEDICINE

## 2024-12-27 PROCEDURE — 2500000002 HC RX 250 W HCPCS SELF ADMINISTERED DRUGS (ALT 637 FOR MEDICARE OP, ALT 636 FOR OP/ED): Performed by: INTERNAL MEDICINE

## 2024-12-27 PROCEDURE — 2500000002 HC RX 250 W HCPCS SELF ADMINISTERED DRUGS (ALT 637 FOR MEDICARE OP, ALT 636 FOR OP/ED): Performed by: HOSPITALIST

## 2024-12-27 RX ADMIN — ASPIRIN 81 MG: 81 TABLET, COATED ORAL at 08:06

## 2024-12-27 RX ADMIN — MECLIZINE HCL 12.5 MG 12.5 MG: 12.5 TABLET ORAL at 08:07

## 2024-12-27 RX ADMIN — FOLIC ACID 1 MG: 1 TABLET ORAL at 08:07

## 2024-12-27 RX ADMIN — LACTULOSE 10 G: 20 SOLUTION ORAL at 08:06

## 2024-12-27 RX ADMIN — PHENYTOIN SODIUM 200 MG: 100 CAPSULE, EXTENDED RELEASE ORAL at 08:07

## 2024-12-27 RX ADMIN — LAMOTRIGINE 400 MG: 100 TABLET ORAL at 08:06

## 2024-12-27 RX ADMIN — CEFTRIAXONE SODIUM 1 G: 1 INJECTION, SOLUTION INTRAVENOUS at 02:26

## 2024-12-27 RX ADMIN — FLUDROCORTISONE ACETATE 0.2 MG: 0.1 TABLET ORAL at 08:10

## 2024-12-27 RX ADMIN — SERTRALINE HYDROCHLORIDE 100 MG: 50 TABLET, FILM COATED ORAL at 08:07

## 2024-12-27 RX ADMIN — Medication 100 MG: at 08:06

## 2024-12-27 NOTE — DOCUMENTATION CLARIFICATION NOTE
"    PATIENT:               XOCHILT FLORES  ACCT #:                  [id number]  MRN:                       [id number]  :                       1940  ADMIT DATE:       2024 7:20 PM  DISCH DATE:        2024 9:45 AM  RESPONDING PROVIDER #:        [id number]          PROVIDER RESPONSE TEXT:    Traumatic fracture    CDI QUERY TEXT:    Clarification        Instruction:    Based on your assessment of the patient and the clinical information, please provide the requested documentation by clicking on the appropriate radio button and enter any additional information if prompted.    Question: Please further clarify specify if able fracture site and type as    When answering this query, please exercise your independent professional judgment. The fact that a question is being asked, does not imply that any particular answer is desired or expected.    The patient's clinical indicators include:  Clinical Information: 84 yom admit with right femur fracture s/p fall from wheelchair    Clinical Indicators:     CT Pelvis:  \"FINDINGS:  There is displaced fracture of the right greater trochanter. There is  a osteopenia. There is bilateral hip osteoarthrosis.\"     H/P:  \"Closed 2-part intertrochanteric fracture of right femur with delayed healing\"     Occupational Therapy:  \"Pt. is 83 y/o male to ED  s/p fall out of wheelchair\"    Treatment: Orthopedics consult, WBAT, CT pelvis    Risk Factors: 84 yom, Osteoarthrosis, osteopenia  Options provided:  -- Pathologic fracture  -- Traumatic fracture  -- Other - I will add my own diagnosis  -- Refer to Clinical Documentation Reviewer    Query created by: Melita Hawk on 2024 10:48 AM      Electronically signed by:  VINCENT SIMMONS MD 2024 11:05 AM          "

## 2024-12-27 NOTE — CARE PLAN
Problem: Nutrition  Goal: Oral intake greater than 50%  Outcome: Progressing  Goal: Adequate PO fluid intake  Outcome: Progressing  Goal: Lab values WNL  Outcome: Progressing  Goal: Electrolytes WNL  Outcome: Progressing  Goal: Maintain stable weight  Outcome: Progressing     Problem: Skin  Goal: Decreased wound size/increased tissue granulation at next dressing change  Outcome: Progressing  Goal: Participates in plan/prevention/treatment measures  Outcome: Progressing  Goal: Prevent/manage excess moisture  Outcome: Progressing  Goal: Prevent/minimize sheer/friction injuries  Outcome: Progressing  Goal: Promote/optimize nutrition  Outcome: Progressing  Goal: Promote skin healing  Outcome: Progressing     Problem: Pain - Adult  Goal: Verbalizes/displays adequate comfort level or baseline comfort level  Outcome: Progressing     Problem: Safety - Adult  Goal: Free from fall injury  Outcome: Progressing     Problem: Discharge Planning  Goal: Discharge to home or other facility with appropriate resources  Outcome: Progressing   The patient's goals for the shift include rest    The clinical goals for the shift include Patient will remain calm and cooperative throughout shift